# Patient Record
Sex: FEMALE | Race: WHITE | Employment: OTHER | URBAN - METROPOLITAN AREA
[De-identification: names, ages, dates, MRNs, and addresses within clinical notes are randomized per-mention and may not be internally consistent; named-entity substitution may affect disease eponyms.]

---

## 2017-11-27 ENCOUNTER — HOSPITAL ENCOUNTER (EMERGENCY)
Age: 72
Discharge: HOME | End: 2017-11-27
Attending: EMERGENCY MEDICINE
Payer: MEDICARE

## 2017-11-27 ENCOUNTER — APPOINTMENT (OUTPATIENT)
Dept: CT IMAGING | Age: 72
End: 2017-11-27
Attending: EMERGENCY MEDICINE
Payer: MEDICARE

## 2017-11-27 VITALS
OXYGEN SATURATION: 100 % | HEIGHT: 61 IN | SYSTOLIC BLOOD PRESSURE: 149 MMHG | BODY MASS INDEX: 32.1 KG/M2 | RESPIRATION RATE: 20 BRPM | WEIGHT: 170 LBS | DIASTOLIC BLOOD PRESSURE: 59 MMHG | HEART RATE: 71 BPM | TEMPERATURE: 98.2 F

## 2017-11-27 DIAGNOSIS — S00.81XA FACIAL ABRASION, INITIAL ENCOUNTER: ICD-10-CM

## 2017-11-27 DIAGNOSIS — S00.03XA RIGHT TEMPORAL FRONTAL SCALP CONTUSIONS, INITIAL ENCOUNTER: Primary | ICD-10-CM

## 2017-11-27 PROCEDURE — 90715 TDAP VACCINE 7 YRS/> IM: CPT | Performed by: EMERGENCY MEDICINE

## 2017-11-27 PROCEDURE — 99284 EMERGENCY DEPT VISIT MOD MDM: CPT

## 2017-11-27 PROCEDURE — 70450 CT HEAD/BRAIN W/O DYE: CPT

## 2017-11-27 PROCEDURE — 74011250636 HC RX REV CODE- 250/636: Performed by: EMERGENCY MEDICINE

## 2017-11-27 PROCEDURE — 90471 IMMUNIZATION ADMIN: CPT

## 2017-11-27 RX ORDER — SIMVASTATIN 20 MG/1
20 TABLET, FILM COATED ORAL
COMMUNITY

## 2017-11-27 RX ORDER — CITALOPRAM 20 MG/1
TABLET, FILM COATED ORAL DAILY
COMMUNITY
End: 2019-08-14

## 2017-11-27 RX ORDER — GLUCOSAMINE SULFATE 1500 MG
POWDER IN PACKET (EA) ORAL DAILY
COMMUNITY

## 2017-11-27 RX ORDER — OMEPRAZOLE 20 MG/1
20 CAPSULE, DELAYED RELEASE ORAL DAILY
COMMUNITY

## 2017-11-27 RX ORDER — LANOLIN ALCOHOL/MO/W.PET/CERES
1000 CREAM (GRAM) TOPICAL DAILY
COMMUNITY
End: 2019-08-14

## 2017-11-27 RX ORDER — CALCIUM CARBONATE 500(1250)
TABLET ORAL DAILY
COMMUNITY

## 2017-11-27 RX ORDER — AMLODIPINE BESYLATE 10 MG/1
20 TABLET ORAL DAILY
COMMUNITY

## 2017-11-27 RX ORDER — TRIAMTERENE/HYDROCHLOROTHIAZID 37.5-25 MG
TABLET ORAL DAILY
COMMUNITY

## 2017-11-27 RX ADMIN — TETANUS TOXOID, REDUCED DIPHTHERIA TOXOID AND ACELLULAR PERTUSSIS VACCINE, ADSORBED 0.5 ML: 5; 2.5; 8; 8; 2.5 SUSPENSION INTRAMUSCULAR at 15:56

## 2017-11-27 NOTE — DISCHARGE INSTRUCTIONS
Scrapes (Abrasions): Care Instructions  Your Care Instructions  Scrapes (abrasions) are wounds where your skin has been rubbed or torn off. Most scrapes do not go deep into the skin, but some may remove several layers of skin. Scrapes usually don't bleed much, but they may ooze pinkish fluid. Scrapes on the head or face may appear worse than they are. They may bleed a lot because of the good blood supply to this area. Most scrapes heal well and may not need a bandage. They usually heal within 3 to 7 days. A large, deep scrape may take 1 to 2 weeks or longer to heal. A scab may form on some scrapes. Follow-up care is a key part of your treatment and safety. Be sure to make and go to all appointments, and call your doctor if you are having problems. It's also a good idea to know your test results and keep a list of the medicines you take. How can you care for yourself at home? · If your doctor told you how to care for your wound, follow your doctor's instructions. If you did not get instructions, follow this general advice:  ¨ Wash the scrape with clean water 2 times a day. Don't use hydrogen peroxide or alcohol, which can slow healing. ¨ You may cover the scrape with a thin layer of petroleum jelly, such as Vaseline, and a nonstick bandage. ¨ Apply more petroleum jelly and replace the bandage as needed. · Prop up the injured area on a pillow anytime you sit or lie down during the next 3 days. Try to keep it above the level of your heart. This will help reduce swelling. · Be safe with medicines. Take pain medicines exactly as directed. ¨ If the doctor gave you a prescription medicine for pain, take it as prescribed. ¨ If you are not taking a prescription pain medicine, ask your doctor if you can take an over-the-counter medicine. When should you call for help?   Call your doctor now or seek immediate medical care if:  ? · You have signs of infection, such as:  ¨ Increased pain, swelling, warmth, or redness around the scrape. ¨ Red streaks leading from the scrape. ¨ Pus draining from the scrape. ¨ A fever. ? · The scrape starts to bleed, and blood soaks through the bandage. Oozing small amounts of blood is normal.   ? Watch closely for changes in your health, and be sure to contact your doctor if the scrape is not getting better each day. Where can you learn more? Go to http://delphine-nestor.info/. Enter A374 in the search box to learn more about \"Scrapes (Abrasions): Care Instructions. \"  Current as of: March 20, 2017  Content Version: 11.4  © 0175-5313 [employer]. Care instructions adapted under license by [employer] (which disclaims liability or warranty for this information). If you have questions about a medical condition or this instruction, always ask your healthcare professional. [place] [address] any warranty or liability for your use of this information. Contusion: Care Instructions  Your Care Instructions    Contusion is the medical term for a bruise. It is the result of a direct blow or an impact, such as a fall. Contusions are common sports injuries. Most people think of a bruise as a black-and-blue spot. This happens when small blood vessels get torn and leak blood under the skin. But bones, muscles, and organs can also get bruised. This may damage deep tissues but not cause a bruise you can see. The doctor will do a physical exam to find the location of your contusion. You may also have tests to make sure you do not have a more serious injury, such as a broken bone or nerve damage. These may include X-rays or other imaging tests like a CT scan or MRI. Deep-tissue contusions may cause pain and swelling. But if there is no serious damage, they will often get better in a few weeks with home treatment. The doctor has checked you carefully, but problems can develop later.  If you notice any problems or new symptoms, get medical treatment right away. Follow-up care is a key part of your treatment and safety. Be sure to make and go to all appointments, and call your doctor if you are having problems. It's also a good idea to know your test results and keep a list of the medicines you take. How can you care for yourself at home? · Put ice or a cold pack on the sore area for 10 to 20 minutes at a time to stop swelling. Put a thin cloth between the ice pack and your skin. · Be safe with medicines. Read and follow all instructions on the label. ¨ If the doctor gave you a prescription medicine for pain, take it as prescribed. ¨ If you are not taking a prescription pain medicine, ask your doctor if you can take an over-the-counter medicine. · If you can, prop up the sore area on pillows as much as possible for the next few days. Try to keep the sore area above the level of your heart. When should you call for help? Call your doctor now or seek immediate medical care if:  ? · Your pain gets worse. ? · You have new or worse swelling. ? · You have tingling, weakness, or numbness in the area near the contusion. ? · The area near the contusion is cold or pale. ? Watch closely for changes in your health, and be sure to contact your doctor if:  ? · You do not get better as expected. Where can you learn more? Go to http://delphine-nestor.info/. Enter T917 in the search box to learn more about \"Contusion: Care Instructions. \"  Current as of: March 20, 2017  Content Version: 11.4  © 5205-8875 [employer]. Care instructions adapted under license by [employer] (which disclaims liability or warranty for this information). If you have questions about a medical condition or this instruction, always ask your healthcare professional. [address] any warranty or liability for your use of this information.

## 2017-11-27 NOTE — ED TRIAGE NOTES
Patient was walking her dog when she tripped hitting the right temple on the ground. Patient with contusion noted. Patient denies LOC. Sepsis Screening completed    (  )Patient meets SIRS criteria. (x  )Patient does not meet SIRS criteria.       SIRS Criteria is achieved when two or more of the following are present   Temperature < 96.8°F (36°C) or > 100.9°F (38.3°C)   Heart Rate > 90 beats per minute   Respiratory Rate > 20 breaths per minute   WBC count > 12,000 or <4,000 or > 10% bands

## 2017-11-27 NOTE — ED PROVIDER NOTES
EMERGENCY DEPARTMENT HISTORY AND PHYSICAL EXAM    Date: 11/27/2017  Patient Name: Carine Snell    History of Presenting Illness     Chief Complaint   Patient presents with    Head Injury    Fall         History Provided By: Patient    Chief Complaint: Dizziness  Duration: 1 Hours  Timing:  Acute  Severity: 5 out of 10  Associated Symptoms: nausea    Additional History (Context):   3:04 PM  Carine Snell is a 67 y.o. female with PMHX HTN and hyperlipidemia who presents to the emergency department C/O improving dizziness s/p fall, onset 1 hour ago. Associated sxs include nausea. States she \"tastes blood in her mouth. \" Pt was walking her dog and over a tree root, and that she hit her right temple. Last tetanus shot was over 10 years ago. Pt denies LOC, blurry vision, blood thinner use, ASA use, vomiting, PND, sinus pain, and any other sxs or complaints. PCP: Binh Alvarez MD    Current Outpatient Prescriptions   Medication Sig Dispense Refill    amLODIPine (NORVASC) 10 mg tablet Take 20 mg by mouth daily.  triamterene-hydroCHLOROthiazide (MAXZIDE) 37.5-25 mg per tablet Take  by mouth daily.  simvastatin (ZOCOR) 20 mg tablet Take 20 mg by mouth nightly.  calcium carbonate (OS-ABBEY) 500 mg calcium (1,250 mg) tablet Take  by mouth daily.  cholecalciferol (VITAMIN D3) 1,000 unit cap Take  by mouth daily.  citalopram (CELEXA) 20 mg tablet Take  by mouth daily.  omeprazole (PRILOSEC) 20 mg capsule Take 20 mg by mouth daily.  denosumab (PROLIA) 60 mg/mL injection 60 mg by SubCUTAneous route.  cyanocobalamin 1,000 mcg tablet Take 1,000 mcg by mouth daily. Past History     Past Medical History:  Past Medical History:   Diagnosis Date    Depression     HTN (hypertension)     Hyperlipidemia     Menopause        Past Surgical History:  Past Surgical History:   Procedure Laterality Date    HX HYSTERECTOMY      Age 27       Family History:  History reviewed.  No pertinent family history. Social History:  Social History   Substance Use Topics    Smoking status: Never Smoker    Smokeless tobacco: Never Used    Alcohol use No       Allergies: Allergies   Allergen Reactions    Actonel [Risedronate] Hives    Metamucil [Psyllium Husk] Hives         Review of Systems   Review of Systems   Constitutional: Negative for activity change, appetite change, fever and unexpected weight change. HENT: Negative for congestion and sore throat. Eyes: Negative for pain and redness. Respiratory: Negative for cough and shortness of breath. Cardiovascular: Negative for chest pain and palpitations. Gastrointestinal: Negative for abdominal pain, diarrhea, nausea and vomiting. Endocrine: Negative for polydipsia and polyuria. Genitourinary: Negative for difficulty urinating and dysuria. Musculoskeletal: Negative for back pain and neck pain. Skin: Negative for pallor and rash. Neurological: Positive for dizziness (resolved). Negative for syncope and headaches. All other systems reviewed and are negative. Physical Exam     Vitals:    11/27/17 1442   BP: 149/59   Pulse: 71   Resp: 20   Temp: 98.2 °F (36.8 °C)   SpO2: 100%   Weight: 77.1 kg (170 lb)   Height: 5' 1\" (1.549 m)     Physical Exam   Constitutional: She is oriented to person, place, and time. She appears well-developed and well-nourished. HENT:   Head: Normocephalic and atraumatic. Right Ear: External ear normal.   Left Ear: External ear normal.   Nose: Nose normal.   Mouth/Throat: Oropharynx is clear and moist.   Eyes: Conjunctivae and EOM are normal. Pupils are equal, round, and reactive to light. Neck: Normal range of motion. Neck supple. No JVD present. No tracheal deviation present. Cardiovascular: Normal rate, regular rhythm, normal heart sounds and intact distal pulses. Exam reveals no gallop and no friction rub. No murmur heard.   Pulmonary/Chest: Effort normal and breath sounds normal. No respiratory distress. She has no wheezes. She has no rales. Abdominal: Soft. Bowel sounds are normal. She exhibits no distension and no mass. There is no tenderness. There is no rebound and no guarding. Musculoskeletal: Normal range of motion. She exhibits no edema or tenderness. Neurological: She is alert and oriented to person, place, and time. She has normal reflexes. No cranial nerve deficit. She exhibits normal muscle tone. Coordination normal.   CN II-XII intact, no facial droop or asymmetry; No pronator drift; finger-nose-finger intact; good  and equal strength 5/5 bilateral upper and lower extremities; DTRs: 2+ upper and lower extremities, symmetric bilaterally; sensation is intact to light touch and position sense upper and lower extremities symmetric bilaterally;  Gait normal   Skin: Skin is warm and dry. No rash noted. Psychiatric: She has a normal mood and affect. Her behavior is normal.   Nursing note and vitals reviewed. Diagnostic Study Results     Labs -   No results found for this or any previous visit (from the past 12 hour(s)). Radiologic Studies -   CT HEAD WO CONT   Final Result   EXAM: CT head     INDICATION: Head injury.     COMPARISON: 4/26/2006.     TECHNIQUE: Axial CT imaging of the head was performed without intravenous  contrast. Coronal and sagittal reconstructions were obtained.     Dose reduction: One or more dose reduction techniques were used on this CT:  automated exposure control, adjustment of the mAs and/or kVp according to  patient's size, and iterative reconstruction techniques. The specific techniques  utilized on this CT exam have been documented in the patient's electronic  medical record.  _______________     FINDINGS:     BRAIN PARENCHYMA: There is no evidence of acute intracranial hemorrhage, mass  effect, midline shift, or herniation. No definite CT evidence of acute cortical  infarct is seen.  The gray-white matter differentiation is within normal limits. Vascular calcifications are present.     VENTRICLES/EXTRA-AXIAL SPACES/MENINGES: The ventricles and sulci are normal in  their size and configuration.     OSSEOUS STRUCTURES: No fracture is seen.     PARANASAL SINUSES/MASTOIDS: Visualized paranasal sinuses and mastoid air cells  are clear.     ORBITS: The visualized orbits are unremarkable.     OTHER: None.       _______________     IMPRESSION  IMPRESSION:     No acute intracranial hemorrhage, mass effect, midline shift, or herniation. No  definite CT evidence of acute cortical infarct is seen. Please note that  noncontrast head CT may be normal in early acute infarct. No new abnormality is seen developing since last study. As read by the radiologist.     Gilda Castro Results  (Last 48 hours)               11/27/17 1616  CT HEAD WO CONT Final result    Impression:  IMPRESSION:       No acute intracranial hemorrhage, mass effect, midline shift, or herniation. No   definite CT evidence of acute cortical infarct is seen. Please note that   noncontrast head CT may be normal in early acute infarct. No new abnormality is seen developing since last study. Narrative:  EXAM: CT head       INDICATION: Head injury. COMPARISON: 4/26/2006. TECHNIQUE: Axial CT imaging of the head was performed without intravenous   contrast. Coronal and sagittal reconstructions were obtained. Dose reduction: One or more dose reduction techniques were used on this CT:   automated exposure control, adjustment of the mAs and/or kVp according to   patient's size, and iterative reconstruction techniques. The specific techniques   utilized on this CT exam have been documented in the patient's electronic   medical record.   _______________       FINDINGS:       BRAIN PARENCHYMA: There is no evidence of acute intracranial hemorrhage, mass   effect, midline shift, or herniation. No definite CT evidence of acute cortical   infarct is seen.  The gray-white matter differentiation is within normal limits. Vascular calcifications are present. VENTRICLES/EXTRA-AXIAL SPACES/MENINGES: The ventricles and sulci are normal in   their size and configuration. OSSEOUS STRUCTURES: No fracture is seen. PARANASAL SINUSES/MASTOIDS: Visualized paranasal sinuses and mastoid air cells   are clear. ORBITS: The visualized orbits are unremarkable. OTHER: None.         _______________               CXR Results  (Last 48 hours)    None            Medical Decision Making   I am the first provider for this patient. I reviewed the vital signs, available nursing notes, past medical history, past surgical history, family history and social history. Vital Signs-Reviewed the patient's vital signs. Pulse Oximetry Analysis - 100% on RA     Records Reviewed: Nursing Notes    Provider Notes (Medical Decision Making):   DDx: Fx, contusion, intracranial hemorrhage, concussion    Procedures:  Procedures    ED Course:   4:39 PM   Initial assessment performed. The patients presenting problems have been discussed, and they are in agreement with the care plan formulated and outlined with them. I have encouraged them to ask questions as they arise throughout their visit. Diagnosis and Disposition       DISCHARGE NOTE:  4:49 PM  [facility]  results have been reviewed with her. She has been counseled regarding her diagnosis, treatment, and plan. She verbally conveys understanding and agreement of the signs, symptoms, diagnosis, treatment and prognosis and additionally agrees to follow up as discussed. She also agrees with the care-plan and conveys that all of her questions have been answered. I have also provided discharge instructions for her that include: educational information regarding their diagnosis and treatment, and list of reasons why they would want to return to the ED prior to their follow-up appointment, should her condition change.  She has been provided with education for proper emergency department utilization. CLINICAL IMPRESSION:    1. Right temporal frontal scalp contusions, initial encounter    2. Facial abrasion, initial encounter        Discussion:  67 y.o. female presented with right forehead injury. Patient stable in the ED. Neuro exam nml. Head CT scan unremarkable. No intracranial hemorrhage or fx. Patient given TDAP IM. Recommended to follow up with PCP for further evaluation. Patient given head injury precautions. PLAN:  1. D/C Home  2. Discharge Medication List as of 11/27/2017  4:41 PM        3. Follow-up Information     Follow up With Details Comments Contact Info    Fba Bedolla MD Schedule an appointment as soon as possible for a visit in 2 days for PCP follow up [phone]  [address] [id number] [phone]      [facility] EMERGENCY DEPT  As needed, If symptoms worsen [address] [id number] [phone]        _______________________________    Attestations: This note is prepared by Jaja Herbert, acting as Scribe for Erick Rose MD.    Erick Rose MD:  The scribe's documentation has been prepared under my direction and personally reviewed by me in its entirety.   I confirm that the note above accurately reflects all work, treatment, procedures, and medical decision making performed by me.  _______________________________

## 2018-04-05 ENCOUNTER — HOSPITAL ENCOUNTER (OUTPATIENT)
Dept: MRI IMAGING | Age: 73
Discharge: HOME | End: 2018-04-05
Attending: ORTHOPAEDIC SURGERY
Payer: MEDICARE

## 2018-04-05 DIAGNOSIS — M54.9 ACUTE BACK PAIN: ICD-10-CM

## 2018-04-05 PROCEDURE — 72148 MRI LUMBAR SPINE W/O DYE: CPT

## 2019-07-27 ENCOUNTER — APPOINTMENT (OUTPATIENT)
Dept: GENERAL RADIOLOGY | Age: 74
End: 2019-07-27
Attending: EMERGENCY MEDICINE
Payer: MEDICARE

## 2019-07-27 ENCOUNTER — HOSPITAL ENCOUNTER (EMERGENCY)
Age: 74
Discharge: HOME | End: 2019-07-28
Attending: EMERGENCY MEDICINE | Admitting: EMERGENCY MEDICINE
Payer: MEDICARE

## 2019-07-27 ENCOUNTER — APPOINTMENT (OUTPATIENT)
Dept: CT IMAGING | Age: 74
End: 2019-07-27
Attending: EMERGENCY MEDICINE
Payer: MEDICARE

## 2019-07-27 DIAGNOSIS — K80.20 CALCULUS OF GALLBLADDER WITHOUT CHOLECYSTITIS WITHOUT OBSTRUCTION: Primary | ICD-10-CM

## 2019-07-27 LAB
ALBUMIN SERPL-MCNC: 3.1 G/DL (ref 3.4–5)
ALBUMIN/GLOB SERPL: 0.9 {RATIO} (ref 0.8–1.7)
ALP SERPL-CCNC: 66 U/L (ref 45–117)
ALT SERPL-CCNC: 15 U/L (ref 13–56)
ANION GAP SERPL CALC-SCNC: 9 MMOL/L (ref 3–18)
AST SERPL-CCNC: 13 U/L (ref 10–38)
BASOPHILS # BLD: 0 K/UL (ref 0–0.1)
BASOPHILS NFR BLD: 0 % (ref 0–2)
BILIRUB SERPL-MCNC: 0.3 MG/DL (ref 0.2–1)
BUN SERPL-MCNC: 20 MG/DL (ref 7–18)
BUN/CREAT SERPL: 21 (ref 12–20)
CALCIUM SERPL-MCNC: 8.3 MG/DL (ref 8.5–10.1)
CHLORIDE SERPL-SCNC: 97 MMOL/L (ref 100–111)
CK MB CFR SERPL CALC: 1.8 % (ref 0–4)
CK MB SERPL-MCNC: 1 NG/ML (ref 5–25)
CK SERPL-CCNC: 56 U/L (ref 26–192)
CO2 SERPL-SCNC: 29 MMOL/L (ref 21–32)
CREAT SERPL-MCNC: 0.97 MG/DL (ref 0.6–1.3)
DIFFERENTIAL METHOD BLD: ABNORMAL
EOSINOPHIL # BLD: 0.4 K/UL (ref 0–0.4)
EOSINOPHIL NFR BLD: 2 % (ref 0–5)
ERYTHROCYTE [DISTWIDTH] IN BLOOD BY AUTOMATED COUNT: 13.3 % (ref 11.6–14.5)
GLOBULIN SER CALC-MCNC: 3.4 G/DL (ref 2–4)
GLUCOSE SERPL-MCNC: 111 MG/DL (ref 74–99)
HCT VFR BLD AUTO: 33.5 % (ref 35–45)
HGB BLD-MCNC: 10.9 G/DL (ref 12–16)
LIPASE SERPL-CCNC: 154 U/L (ref 73–393)
LYMPHOCYTES # BLD: 2.7 K/UL (ref 0.9–3.6)
LYMPHOCYTES NFR BLD: 16 % (ref 21–52)
MAGNESIUM SERPL-MCNC: 2.1 MG/DL (ref 1.6–2.6)
MCH RBC QN AUTO: 29.5 PG (ref 24–34)
MCHC RBC AUTO-ENTMCNC: 32.5 G/DL (ref 31–37)
MCV RBC AUTO: 90.5 FL (ref 74–97)
MONOCYTES # BLD: 1.5 K/UL (ref 0.05–1.2)
MONOCYTES NFR BLD: 9 % (ref 3–10)
NEUTS SEG # BLD: 12.2 K/UL (ref 1.8–8)
NEUTS SEG NFR BLD: 73 % (ref 40–73)
PLATELET # BLD AUTO: 232 K/UL (ref 135–420)
PMV BLD AUTO: 9.7 FL (ref 9.2–11.8)
POTASSIUM SERPL-SCNC: 3.4 MMOL/L (ref 3.5–5.5)
PROT SERPL-MCNC: 6.5 G/DL (ref 6.4–8.2)
RBC # BLD AUTO: 3.7 M/UL (ref 4.2–5.3)
SODIUM SERPL-SCNC: 135 MMOL/L (ref 136–145)
TROPONIN I SERPL-MCNC: <0.02 NG/ML (ref 0–0.04)
WBC # BLD AUTO: 16.8 K/UL (ref 4.6–13.2)

## 2019-07-27 PROCEDURE — 99285 EMERGENCY DEPT VISIT HI MDM: CPT

## 2019-07-27 PROCEDURE — 83690 ASSAY OF LIPASE: CPT

## 2019-07-27 PROCEDURE — 96375 TX/PRO/DX INJ NEW DRUG ADDON: CPT

## 2019-07-27 PROCEDURE — 82550 ASSAY OF CK (CPK): CPT

## 2019-07-27 PROCEDURE — 80053 COMPREHEN METABOLIC PANEL: CPT

## 2019-07-27 PROCEDURE — 74011250637 HC RX REV CODE- 250/637: Performed by: EMERGENCY MEDICINE

## 2019-07-27 PROCEDURE — 85025 COMPLETE CBC W/AUTO DIFF WBC: CPT

## 2019-07-27 PROCEDURE — 71045 X-RAY EXAM CHEST 1 VIEW: CPT

## 2019-07-27 PROCEDURE — 74011636320 HC RX REV CODE- 636/320: Performed by: EMERGENCY MEDICINE

## 2019-07-27 PROCEDURE — 96374 THER/PROPH/DIAG INJ IV PUSH: CPT

## 2019-07-27 PROCEDURE — 83735 ASSAY OF MAGNESIUM: CPT

## 2019-07-27 PROCEDURE — 74177 CT ABD & PELVIS W/CONTRAST: CPT

## 2019-07-27 PROCEDURE — 74011250636 HC RX REV CODE- 250/636: Performed by: EMERGENCY MEDICINE

## 2019-07-27 PROCEDURE — 93005 ELECTROCARDIOGRAM TRACING: CPT

## 2019-07-27 RX ORDER — METOCLOPRAMIDE HYDROCHLORIDE 5 MG/ML
10 INJECTION INTRAMUSCULAR; INTRAVENOUS
Status: COMPLETED | OUTPATIENT
Start: 2019-07-27 | End: 2019-07-27

## 2019-07-27 RX ORDER — FAMOTIDINE 10 MG/ML
20 INJECTION INTRAVENOUS
Status: COMPLETED | OUTPATIENT
Start: 2019-07-27 | End: 2019-07-27

## 2019-07-27 RX ORDER — ONDANSETRON 4 MG/1
4 TABLET, ORALLY DISINTEGRATING ORAL
Status: COMPLETED | OUTPATIENT
Start: 2019-07-27 | End: 2019-07-27

## 2019-07-27 RX ORDER — NITROGLYCERIN 0.4 MG/1
0.4 TABLET SUBLINGUAL ONCE
Status: COMPLETED | OUTPATIENT
Start: 2019-07-27 | End: 2019-07-27

## 2019-07-27 RX ADMIN — ONDANSETRON 4 MG: 4 TABLET, ORALLY DISINTEGRATING ORAL at 21:36

## 2019-07-27 RX ADMIN — NITROGLYCERIN 0.4 MG: 0.4 TABLET SUBLINGUAL at 21:32

## 2019-07-27 RX ADMIN — METOCLOPRAMIDE 10 MG: 5 INJECTION, SOLUTION INTRAMUSCULAR; INTRAVENOUS at 21:37

## 2019-07-27 RX ADMIN — FAMOTIDINE 20 MG: 10 INJECTION, SOLUTION INTRAVENOUS at 21:37

## 2019-07-27 RX ADMIN — IOPAMIDOL 100 ML: 612 INJECTION, SOLUTION INTRAVENOUS at 22:18

## 2019-07-28 VITALS
RESPIRATION RATE: 23 BRPM | HEIGHT: 61 IN | OXYGEN SATURATION: 89 % | DIASTOLIC BLOOD PRESSURE: 53 MMHG | WEIGHT: 186 LBS | SYSTOLIC BLOOD PRESSURE: 131 MMHG | HEART RATE: 68 BPM | TEMPERATURE: 98.5 F | BODY MASS INDEX: 35.12 KG/M2

## 2019-07-28 RX ORDER — ONDANSETRON 4 MG/1
4 TABLET, FILM COATED ORAL
Qty: 12 TAB | Refills: 0 | Status: SHIPPED | OUTPATIENT
Start: 2019-07-28

## 2019-07-28 RX ORDER — METOCLOPRAMIDE 10 MG/1
10 TABLET ORAL
Qty: 20 TAB | Refills: 1 | Status: SHIPPED | OUTPATIENT
Start: 2019-07-28

## 2019-07-28 NOTE — ED NOTES
I have reviewed discharge instructions with the patient and spouse. The patient and spouse verbalized understanding. Patient armband removed and shredded  Patient d/c home in stable condition, ambulatory. No distress noted.

## 2019-07-28 NOTE — ED NOTES
Patient reports right sided chest discomfort underneath breast, patient is constantly belching. Patient reports eating fish, french fries, jello with lots of whipped cream for dinner this evening.

## 2019-07-28 NOTE — ED PROVIDER NOTES
EMERGENCY DEPARTMENT HISTORY AND PHYSICAL EXAM    Date: 7/27/2019  Patient Name: Jonn Aparicio    History of Presenting Illness     Chief Complaint   Patient presents with    Cough    Indigestion         History Provided By: Patient    Additional History (Context):     9:15 PM    Jonn Aparicio is a 76 y.o. female with pertinent PMHx of HTN and HLD presenting via EMS to the ED c/o constant eructations x ~4 hours ago. Pt notes associated symptoms of RUQ abdominal pain, abdominal distension, and right mid chest pain. Pt states that her sxs began slightly after dinner (she had steak and fries for dinner). Pt states that she was recently seen by her PCP for cough. She stated she had a CXR, but was not given any abx. Pt denies any history of cholecystectomy, MI, strokes, or appendectomy. Pt specifically denies any vomiting, urinary sxs, fever/chills, or diarrhea. PCP: Pricila Jiménez MD  Pt does not smoke tobacco, drink EtOH excessively, or do illicit drugs. There are no other complaints, changes, or physical findings at this time. Current Outpatient Medications   Medication Sig Dispense Refill    metoclopramide HCl (REGLAN) 10 mg tablet Take 1 Tab by mouth every six (6) hours as needed (biliary colic). 20 Tab 1    amLODIPine (NORVASC) 10 mg tablet Take 20 mg by mouth daily.  triamterene-hydroCHLOROthiazide (MAXZIDE) 37.5-25 mg per tablet Take  by mouth daily.  simvastatin (ZOCOR) 20 mg tablet Take 20 mg by mouth nightly.  calcium carbonate (OS-ABBEY) 500 mg calcium (1,250 mg) tablet Take  by mouth daily.  cholecalciferol (VITAMIN D3) 1,000 unit cap Take  by mouth daily.  citalopram (CELEXA) 20 mg tablet Take  by mouth daily.  omeprazole (PRILOSEC) 20 mg capsule Take 20 mg by mouth daily.  denosumab (PROLIA) 60 mg/mL injection 60 mg by SubCUTAneous route.  cyanocobalamin 1,000 mcg tablet Take 1,000 mcg by mouth daily.          Past History     Past Medical History:  Past Medical History:   Diagnosis Date    Depression     HTN (hypertension)     Hyperlipidemia     Menopause        Past Surgical History:  Past Surgical History:   Procedure Laterality Date    HX HYSTERECTOMY      Age 27       Family History:  History reviewed. No pertinent family history. Social History:  Social History     Tobacco Use    Smoking status: Never Smoker    Smokeless tobacco: Never Used   Substance Use Topics    Alcohol use: No    Drug use: Not on file       Allergies: Allergies   Allergen Reactions    Actonel [Risedronate] Hives    Metamucil [Psyllium Husk] Hives         Review of Systems   Review of Systems   Constitutional: Negative for chills and fever. Cardiovascular: Positive for chest pain (right sided). Gastrointestinal: Positive for abdominal distention and abdominal pain (RUQ). Negative for diarrhea and vomiting. Positive for eructations   Genitourinary: Negative. All other systems reviewed and are negative. Physical Exam     Vitals:    07/27/19 2130 07/27/19 2132 07/27/19 2145 07/27/19 2245   BP: 130/42 130/42 (!) 126/39 136/48   Pulse: 68 69 71 74   Resp: 21  21 29   Temp:       SpO2: 94%  93% 95%   Weight:       Height:         Physical Exam   Constitutional: She is oriented to person, place, and time. She appears well-developed and well-nourished. No distress. Nontoxic, uncomfortable appearing with persistent hiccups   HENT:   Head: Normocephalic and atraumatic. Right Ear: External ear normal.   Left Ear: External ear normal.   Mouth/Throat: Oropharynx is clear and moist. No oropharyngeal exudate. Dry mucous membranes   Eyes: Pupils are equal, round, and reactive to light. Conjunctivae and EOM are normal. No scleral icterus. No pallor   Neck: Normal range of motion. Neck supple. No JVD present. No tracheal deviation present. No thyromegaly present. Cardiovascular: Normal rate, regular rhythm and normal heart sounds.    Pulmonary/Chest: Effort normal and breath sounds normal. No stridor. No respiratory distress. Abdominal: Soft. Bowel sounds are normal. There is no tenderness. There is no rebound and no guarding. Protuberant abdomen without significant distension   Musculoskeletal: Normal range of motion. She exhibits no edema or tenderness. No soft tissue injuries   Lymphadenopathy:     She has no cervical adenopathy. Neurological: She is alert and oriented to person, place, and time. She has normal reflexes. No cranial nerve deficit. Coordination normal.   Skin: Skin is warm and dry. No rash noted. She is not diaphoretic. No erythema. Psychiatric: She has a normal mood and affect. Her behavior is normal. Judgment and thought content normal.   Nursing note and vitals reviewed. Diagnostic Study Results     Labs -     Recent Results (from the past 12 hour(s))   EKG, 12 LEAD, INITIAL    Collection Time: 07/27/19  8:27 PM   Result Value Ref Range    Ventricular Rate 71 BPM    Atrial Rate 71 BPM    P-R Interval 168 ms    QRS Duration 148 ms    Q-T Interval 462 ms    QTC Calculation (Bezet) 502 ms    Calculated P Axis 53 degrees    Calculated T Axis 112 degrees    Diagnosis       Normal sinus rhythm  Left bundle branch block  Abnormal ECG  When compared with ECG of 01-AUG-2012 11:28,  QRS duration has increased     CBC WITH AUTOMATED DIFF    Collection Time: 07/27/19  8:35 PM   Result Value Ref Range    WBC 16.8 (H) 4.6 - 13.2 K/uL    RBC 3.70 (L) 4.20 - 5.30 M/uL    HGB 10.9 (L) 12.0 - 16.0 g/dL    HCT 33.5 (L) 35.0 - 45.0 %    MCV 90.5 74.0 - 97.0 FL    MCH 29.5 24.0 - 34.0 PG    MCHC 32.5 31.0 - 37.0 g/dL    RDW 13.3 11.6 - 14.5 %    PLATELET 119 070 - 971 K/uL    MPV 9.7 9.2 - 11.8 FL    NEUTROPHILS 73 40 - 73 %    LYMPHOCYTES 16 (L) 21 - 52 %    MONOCYTES 9 3 - 10 %    EOSINOPHILS 2 0 - 5 %    BASOPHILS 0 0 - 2 %    ABS. NEUTROPHILS 12.2 (H) 1.8 - 8.0 K/UL    ABS. LYMPHOCYTES 2.7 0.9 - 3.6 K/UL    ABS.  MONOCYTES 1.5 (H) 0.05 - 1.2 K/UL    ABS. EOSINOPHILS 0.4 0.0 - 0.4 K/UL    ABS. BASOPHILS 0.0 0.0 - 0.1 K/UL    DF AUTOMATED     CARDIAC PANEL,(CK, CKMB & TROPONIN)    Collection Time: 07/27/19  8:35 PM   Result Value Ref Range    CK 56 26 - 192 U/L    CK - MB 1.0 <3.6 ng/ml    CK-MB Index 1.8 0.0 - 4.0 %    Troponin-I, QT <0.02 0.0 - 9.189 NG/ML   METABOLIC PANEL, COMPREHENSIVE    Collection Time: 07/27/19  8:35 PM   Result Value Ref Range    Sodium 135 (L) 136 - 145 mmol/L    Potassium 3.4 (L) 3.5 - 5.5 mmol/L    Chloride 97 (L) 100 - 111 mmol/L    CO2 29 21 - 32 mmol/L    Anion gap 9 3.0 - 18 mmol/L    Glucose 111 (H) 74 - 99 mg/dL    BUN 20 (H) 7.0 - 18 MG/DL    Creatinine 0.97 0.6 - 1.3 MG/DL    BUN/Creatinine ratio 21 (H) 12 - 20      GFR est AA >60 >60 ml/min/1.73m2    GFR est non-AA 56 (L) >60 ml/min/1.73m2    Calcium 8.3 (L) 8.5 - 10.1 MG/DL    Bilirubin, total 0.3 0.2 - 1.0 MG/DL    ALT (SGPT) 15 13 - 56 U/L    AST (SGOT) 13 10 - 38 U/L    Alk. phosphatase 66 45 - 117 U/L    Protein, total 6.5 6.4 - 8.2 g/dL    Albumin 3.1 (L) 3.4 - 5.0 g/dL    Globulin 3.4 2.0 - 4.0 g/dL    A-G Ratio 0.9 0.8 - 1.7     LIPASE    Collection Time: 07/27/19  8:35 PM   Result Value Ref Range    Lipase 154 73 - 393 U/L   MAGNESIUM    Collection Time: 07/27/19  8:35 PM   Result Value Ref Range    Magnesium 2.1 1.6 - 2.6 mg/dL       Radiologic Studies -   CT ABD PELV W CONT   Final Result   IMPRESSION[de-identified] Numerous pulmonary nodules seen at both lung bases. Follow-up as   per Fleischner Society protocol. Suggestion of 8 mm polyp or stone in the neck of the gallbladder      Colonic diverticulosis without diverticulitis      Cystic structures in the pelvis which do not appear to communicate with the   urinary bladder suspicious for pelvic cystic masses. Advise pelvic ultrasound   for further evaluation.       Small low-density lesion in the head of the pancreas possibly a pancreatic cyst   or cystic mass advise follow-up with MRI in one year to evaluate for continued   stability.      ========      Fleischner Society Pulmonary Nodule Guidelines (revised 2017): Multiple solid nodules >8 mm:   -Low risk for lung cancer: CT at 3-6 months, then consider CT at 18-24 months.    -High risk for lung cancer: CT at 3-6 months, then CT at 18-24 months. XR CHEST PORT    (Results Pending)     CT Results  (Last 48 hours)               07/27/19 2233  CT ABD PELV W CONT Final result    Impression:  IMPRESSION[de-identified] Numerous pulmonary nodules seen at both lung bases. Follow-up as   per Fleischner Society protocol. Suggestion of 8 mm polyp or stone in the neck of the gallbladder       Colonic diverticulosis without diverticulitis       Cystic structures in the pelvis which do not appear to communicate with the   urinary bladder suspicious for pelvic cystic masses. Advise pelvic ultrasound   for further evaluation. Small low-density lesion in the head of the pancreas possibly a pancreatic cyst   or cystic mass advise follow-up with MRI in one year to evaluate for continued   stability.       ========       Fleischner Society Pulmonary Nodule Guidelines (revised 2017): Multiple solid nodules >8 mm:   -Low risk for lung cancer: CT at 3-6 months, then consider CT at 18-24 months.    -High risk for lung cancer: CT at 3-6 months, then CT at 18-24 months. Narrative:  EXAM: CT of the abdomen and pelvis       INDICATION: Abdominal pain, belching       COMPARISON: None. TECHNIQUE: Axial CT imaging of the abdomen and pelvis was performed with   intravenous contrast. Multiplanar reformats were generated. One or more dose   reduction techniques were used on this CT: automated exposure control,   adjustment of the mAs and/or kVp according to patient size, and iterative   reconstruction techniques. The specific techniques used on this CT exam have   been documented in the patient's electronic medical record.  Digital Imaging and   Communications in Medicine (DICOM) format image data are available to   nonaffiliated external healthcare facilities or entities on a secure, media   free, reciprocally searchable basis with patient authorization for at least a   12-month period after this study. _______________       FINDINGS:       LOWER CHEST: There are multiple nodular densities at both lung bases 2 numerous   to count. Largest of these measures 1 cm. Follow-up as per Fleischner Society   protocol. LIVER, BILIARY: Liver is normal. No biliary dilation. There is 8 mm polyp or   stone in the neck of the gallbladder. No CT evidence for acute cholecystitis. This may represent an underlying polyp. PANCREAS: There is a 7 mm low-density structure in the head of the pancreas   suggesting a pancreatic cyst or cystic mass. Advise MRI for further evaluation   in one year to evaluate for continued stability. SPLEEN: Normal.       ADRENALS: Normal.       KIDNEYS: Cortical cyst seen in the midpole the left kidney. The kidneys are   unremarkable otherwise. VASCULATURE: Moderate to severe calcific atherosclerosis present. LYMPH NODES: No enlarged lymph nodes. GASTROINTESTINAL TRACT: No bowel dilation or wall thickening. Colonic   diverticulosis present without diverticulitis. Appendix is not visualized. No   bowel obstruction. PELVIC ORGANS: Hysterectomy. Urinary bladder is unremarkable. Anterior to the   urinary bladder there is a 5.7 x 4.4 x 5 cm cystic structure which does not   appear to definitely communicate with the urinary bladder to suggest a bladder   diverticulum. This may represent a cystic pelvic mass. Advise pelvic ultrasound   for further evaluation. A second cystic structure seen in the right hemipelvis   posterior and lateral to the urinary bladder measuring 3 x 1.8 cm again   differential is the same. BONES: No acute or aggressive osseous abnormalities identified.        OTHER: None.       _______________ 11:45 PM  RADIOLOGY FINDINGS  Chest X-ray shows NAP  Pending review by Radiologist  Recorded by Monica Villa, ED Scribe, as dictated by Bret Montero. Bakari Kent MD.       Medical Decision Making   I am the first provider for this patient. I reviewed the vital signs, available nursing notes, past medical history, past surgical history, family history and social history. Vital Signs-Reviewed the patient's vital signs. Pulse Oximetry Analysis - 98% on RA     Cardiac Monitor:  Rate: 70 bpm  Rhythm: NSR    EKG interpretation: (Preliminary)  NSR at 71 bpm. LBBB. No sgarbossa criteria. EKG read by Bret Montero. Bakari Kent MD at 2027     Records Reviewed: Nursing Notes and Old Medical Records    Provider Notes (Medical Decision Making): DDx: persistent belching suggests slowing of GI sx. Unlikely this is anginal equivalent. EKG shows a left bundle which is stable compared to 2012. Will try to use Reglan, Pepcid, and NTG to mobilize GI sxs. Given her WBC, will likely scan for obstruction or infection process. PROCEDURES:  Procedures    MEDICATIONS GIVEN IN THE ED:  Medications   famotidine (PF) (PEPCID) injection 20 mg (20 mg IntraVENous Given 7/27/19 2137)   metoclopramide HCl (REGLAN) injection 10 mg (10 mg IntraVENous Given 7/27/19 2137)   ondansetron (ZOFRAN ODT) tablet 4 mg (4 mg Oral Given 7/27/19 2136)   nitroglycerin (NITROSTAT) tablet 0.4 mg (0.4 mg SubLINGual Given 7/27/19 2132)   iopamidol (ISOVUE 300) 61 % contrast injection  mL (100 mL IntraVENous Given 7/27/19 2218)        ED COURSE:   9:15 PM   Initial assessment performed. PROGRESS NOTE:  10:02 PM  Pt and/or family have been updated on their results. Pt and/or pt's family are aware of the plan of care and are in agreement. Written by Marge Hicks ED Scribe, as dictated by Julio Kent MD.      10:15 PM - Pt is no longer hiccupping and feels better. 12:08 AM - Patient states that she is comfortable with going home. Discussed risks and benefits. Diagnosis and Disposition       DISCHARGE NOTE:  12:11 AM  The patient is ready for discharge. The patient's signs, symptoms, diagnosis, and discharge instructions have been discussed and the patient and/or family has conveyed their understanding. The patient and/or family is to follow up as recommended or return to the ER should their symptoms worsen. Plan has been discussed and the patient and/or family is in agreement. Written by Damaris Rodriguez ED Scribe, as dictated by Edith Sahu MD.     CLINICAL IMPRESSION:  1. Calculus of gallbladder without cholecystitis without obstruction        PLAN:  1. D/C Home  2. Current Discharge Medication List      START taking these medications    Details   metoclopramide HCl (REGLAN) 10 mg tablet Take 1 Tab by mouth every six (6) hours as needed (biliary colic). Qty: 20 Tab, Refills: 1         CONTINUE these medications which have NOT CHANGED    Details   amLODIPine (NORVASC) 10 mg tablet Take 20 mg by mouth daily. triamterene-hydroCHLOROthiazide (MAXZIDE) 37.5-25 mg per tablet Take  by mouth daily. simvastatin (ZOCOR) 20 mg tablet Take 20 mg by mouth nightly. calcium carbonate (OS-ABBEY) 500 mg calcium (1,250 mg) tablet Take  by mouth daily. cholecalciferol (VITAMIN D3) 1,000 unit cap Take  by mouth daily. citalopram (CELEXA) 20 mg tablet Take  by mouth daily. omeprazole (PRILOSEC) 20 mg capsule Take 20 mg by mouth daily. denosumab (PROLIA) 60 mg/mL injection 60 mg by SubCUTAneous route. cyanocobalamin 1,000 mcg tablet Take 1,000 mcg by mouth daily. 3.   Follow-up Information    None       _______________________________    Attestations: This note is prepared by Re Samano, acting as Scribe for Keysha Sahu MD.    Keysha Sahu MD:  The scribe's documentation has been prepared under my direction and personally reviewed by me in its entirety.  I confirm that the note above accurately reflects all work, treatment, procedures, and medical decision making performed by me.  _______________________________

## 2019-07-28 NOTE — ED NOTES
Oxygen saturations dropped to 87% while sleeping, patient reports she has sleep apnea, nasal cannula 2L placed and patients sats back up to normal. Dr. Duy Vann at bedside.

## 2019-07-28 NOTE — ED TRIAGE NOTES
Pt arrives via ems stretcher with c\o pain under right breast, incessant belching, indigestion,after eating dinner, pt sts she has had gallbladder pain before,  pt is able to make needs known speaking in complete sentences, pt in nad at this time

## 2019-07-28 NOTE — DISCHARGE INSTRUCTIONS
Patient Education        Biliary Colic: Care Instructions  Your Care Instructions    Biliary (say \"BILL-ee-air-ee\") colic is belly pain caused by gallbladder problems. It is usually caused by a gallstone moving through or blocking the common bile duct or cystic duct. Gallstones are stones that form in the gallbladder. They are made of cholesterol and other substances. The gallbladder is a small sac located just under the liver. It stores bile released by the liver. Bile helps you digest fats. Gallstones also can form in the common bile duct or cystic duct. These ducts carry bile from the gallbladder and the liver to the small intestine. Gallstones may be as small as a grain of sand or as large as a golf ball. Gallstones that cause severe symptoms usually are treated with surgery to remove the gallbladder. If the first attack of biliary colic is mild, it is often safe to wait until you have had another attack before you think about having surgery. The doctor has checked you carefully, but problems can develop later. If you notice any problems or new symptoms, get medical treatment right away. Follow-up care is a key part of your treatment and safety. Be sure to make and go to all appointments, and call your doctor if you are having problems. It's also a good idea to know your test results and keep a list of the medicines you take. How can you care for yourself at home? · Take pain medicines exactly as directed. ? If the doctor gave you a prescription medicine for pain, take it as prescribed. ? If you are not taking a prescription pain medicine, ask your doctor if you can take an over-the-counter medicine. Read and follow all instructions on the label. · Avoid foods that cause symptoms, especially fatty foods. These can cause biliary colic. · You may need more tests to look at your gallbladder. When should you call for help? Call your doctor now or seek immediate medical care if:    · You have a fever.   · You have new belly pain, or your pain gets worse.     · There is a new or increasing yellow tint to your skin or the whites of your eyes.     · Your urine is dark yellow-brown, or your stools are light-colored or white.     · You cannot keep down fluids.    Watch closely for changes in your health, and be sure to contact your doctor if:    · You do not get better as expected.     · You are not getting better after 1 day (24 hours). Where can you learn more? Go to http://delphine-nestor.info/. Enter Y861 in the search box to learn more about \"Biliary Colic: Care Instructions. \"  Current as of: November 7, 2018  Content Version: 12.1  © 3692-1165 [employer]. Care instructions adapted under license by [employer] (which disclaims liability or warranty for this information). If you have questions about a medical condition or this instruction, always ask your healthcare professional. [place] [address] any warranty or liability for your use of this information.

## 2019-07-29 LAB
ATRIAL RATE: 71 BPM
CALCULATED P AXIS, ECG09: 53 DEGREES
CALCULATED T AXIS, ECG11: 112 DEGREES
DIAGNOSIS, 93000: NORMAL
P-R INTERVAL, ECG05: 168 MS
Q-T INTERVAL, ECG07: 462 MS
QRS DURATION, ECG06: 148 MS
QTC CALCULATION (BEZET), ECG08: 502 MS
VENTRICULAR RATE, ECG03: 71 BPM

## 2019-08-14 ENCOUNTER — HOSPITAL ENCOUNTER (OUTPATIENT)
Dept: PREADMISSION TESTING | Age: 74
Discharge: HOME | End: 2019-08-14
Payer: MEDICARE

## 2019-08-14 ENCOUNTER — ANESTHESIA EVENT (OUTPATIENT)
Dept: SURGERY | Age: 74
End: 2019-08-14
Payer: MEDICARE

## 2019-08-14 LAB
ALBUMIN SERPL-MCNC: 3.6 G/DL (ref 3.4–5)
ALBUMIN/GLOB SERPL: 0.9 {RATIO} (ref 0.8–1.7)
ALP SERPL-CCNC: 63 U/L (ref 45–117)
ALT SERPL-CCNC: 21 U/L (ref 13–56)
ANION GAP SERPL CALC-SCNC: 6 MMOL/L (ref 3–18)
APTT PPP: 29.3 SEC (ref 23–36.4)
AST SERPL-CCNC: 16 U/L (ref 10–38)
BASOPHILS # BLD: 0.1 K/UL (ref 0–0.1)
BASOPHILS NFR BLD: 1 % (ref 0–2)
BILIRUB SERPL-MCNC: 0.3 MG/DL (ref 0.2–1)
BUN SERPL-MCNC: 18 MG/DL (ref 7–18)
BUN/CREAT SERPL: 20 (ref 12–20)
CALCIUM SERPL-MCNC: 9.4 MG/DL (ref 8.5–10.1)
CHLORIDE SERPL-SCNC: 100 MMOL/L (ref 100–111)
CO2 SERPL-SCNC: 34 MMOL/L (ref 21–32)
CREAT SERPL-MCNC: 0.88 MG/DL (ref 0.6–1.3)
DIFFERENTIAL METHOD BLD: ABNORMAL
EOSINOPHIL # BLD: 0.1 K/UL (ref 0–0.4)
EOSINOPHIL NFR BLD: 1 % (ref 0–5)
ERYTHROCYTE [DISTWIDTH] IN BLOOD BY AUTOMATED COUNT: 14 % (ref 11.6–14.5)
GLOBULIN SER CALC-MCNC: 3.8 G/DL (ref 2–4)
GLUCOSE SERPL-MCNC: 79 MG/DL (ref 74–99)
HCT VFR BLD AUTO: 38 % (ref 35–45)
HGB BLD-MCNC: 11.8 G/DL (ref 12–16)
INR PPP: 1.1 (ref 0.8–1.2)
LYMPHOCYTES # BLD: 2.3 K/UL (ref 0.9–3.6)
LYMPHOCYTES NFR BLD: 21 % (ref 21–52)
MCH RBC QN AUTO: 28.7 PG (ref 24–34)
MCHC RBC AUTO-ENTMCNC: 31.1 G/DL (ref 31–37)
MCV RBC AUTO: 92.5 FL (ref 74–97)
MONOCYTES # BLD: 0.8 K/UL (ref 0.05–1.2)
MONOCYTES NFR BLD: 7 % (ref 3–10)
NEUTS SEG # BLD: 7.7 K/UL (ref 1.8–8)
NEUTS SEG NFR BLD: 70 % (ref 40–73)
PLATELET # BLD AUTO: 301 K/UL (ref 135–420)
PMV BLD AUTO: 9.5 FL (ref 9.2–11.8)
POTASSIUM SERPL-SCNC: 4.3 MMOL/L (ref 3.5–5.5)
PROT SERPL-MCNC: 7.4 G/DL (ref 6.4–8.2)
PROTHROMBIN TIME: 13.7 SEC (ref 11.5–15.2)
RBC # BLD AUTO: 4.11 M/UL (ref 4.2–5.3)
SODIUM SERPL-SCNC: 140 MMOL/L (ref 136–145)
WBC # BLD AUTO: 11 K/UL (ref 4.6–13.2)

## 2019-08-14 PROCEDURE — 85730 THROMBOPLASTIN TIME PARTIAL: CPT

## 2019-08-14 PROCEDURE — 80053 COMPREHEN METABOLIC PANEL: CPT

## 2019-08-14 PROCEDURE — 85610 PROTHROMBIN TIME: CPT

## 2019-08-14 PROCEDURE — 36415 COLL VENOUS BLD VENIPUNCTURE: CPT

## 2019-08-14 PROCEDURE — 85025 COMPLETE CBC W/AUTO DIFF WBC: CPT

## 2019-08-14 NOTE — H&P
PATIENT: Gwynneth Homans  YOB: 1945  DATE: 08/13/2019 1:45 PM   VISIT TYPE: Office Visit  HISTORIAN: self  _____________________________________________________________    Completed Orders (this encounter)  Order Interpretation Result Next Lab Date   Dietary management education, guidance, and counseling        Assessment/Plan  # Detail Type Description    1. Assessment Calculus of gallbladder w chronic cholecyst w/o obstruction (K80.10). Patient Plan  discussed the pathophysiology of gallstone disease, stones will not resolve spontaneously, discussed the risks of surgery versus no surgery, discussed choledocolithiasis, gallstone pancreatitis and acute cholecystitis, she is very sx and suggest more urgent lap ann         2. Assessment Lung nodules (R91.8). Impression not noted on xrays in the past, this will need evaluation post op, ? asbestosis. Patient Plan pulmonary evaluation         3. Assessment Pancreatic cyst (K86.2). Impression likely benign. Patient Plan will w/u after lap ann         4. Assessment Pelvic mass in female (R19.00). Impression she has had complete hysterectomy, ? bladder diverticulum. Patient Plan w/u after lap ann         5. Assessment Body mass index (BMI) 34.0-34.9, adult (Z68.34). Plan Orders Today's instructions / counseling include(s) Dietary management education, guidance, and counseling. This 76year old female presents for Gallbladder Disease. History of Present Illness:  1. Gallbladder Disease   Onset was 3 weeks ago. Severity: moderate-severe. The problem is worse. It occurs constantly. Location is epigastric and RUQ. There is radiation to back and lower abdomen. The patient describes it as colicky and sharp. Context includes after meals. Identified risk factors include age > 61 and pregnancy. Symptom is aggravated by fatty foods and Mostly anything. Relieving factors include analgesics.   Additional information: 76 year old patient is being seen for gallstones. The patient stated that before she went to the emergency room she kept belching. 8mm stone/polyp GB neck, no jaundice, no f/c. PROBLEM LIST:   Problem List reviewed. Problem Description Onset Date Chronic Clinical Status Notes   Depressive disorder 2013 Y     Hyperlipidemia 2013 Y     Vitamin D deficiency 2013 Y     Gastro-esophageal reflux disease with esophagitis 2013 Y     Benign essential hypertension 2013 Y     Asthma 2013 Y     Organic sleep apnea 2013 Y           PAST MEDICAL/SURGICAL HISTORY  (Detailed)    Disease/disorder Onset Date Management Date Comments     Hysterectomy       Knee surgery     Asthma       Sleep apnea         Family History  (Detailed)  Relationship Family Member Name  Age at Death Condition Onset Age Cause of Death   Father  Y  Heart disease  Y   Father  Y    N       Social History:  (Detailed)  Tobacco use reviewed. Preferred language is [place]. MARITAL STATUS/FAMILY/SOCIAL SUPPORT  Currently . Tobacco use status: Current non-smoker. Smoking status: Never smoker. SMOKING STATUS  Use Status Type Smoking Status Usage Per Day Years Used Total Pack Years   no/never  Never smoker        VAPING USE  Screened for vaping? Yes  Status: Not a current user    TOBACCO/VAPING EXPOSURE  No passive vaping exposure. There is passive smoke exposure. ALCOHOL  There is no history of alcohol use. CAFFEINE  The patient uses caffeine: coffee - 2 cups a day. LIFESTYLE  Moderate activity level. Exercises 3-4 times a week. DIET  healthy.         Medications (active prior to today)  Medication Instructions Start Date Stop Date Refilled Elsewhere   Prolia 60 mg/mL subcutaneous syringe inject 1 milliliter by subcutaneous route  every 6 months in the upper arm, upper thigh or abdomen 10/04/2016   N   Shingrix (PF) 50 mcg/0.5 mL intramuscular suspension inject 0.5 milliliter by intramuscular route once 08/01/2018   N   Trelegy Ellipta 100 mcg-62.5 mcg-25 mcg powder for inhalation inhale 1 puff by inhalation route  every day at the same time each day 02/06/2019   N   CITALOPRAM HYDROBROMIDE 20 MG Tablet TAKE 1 TABLET EVERY DAY 03/05/2019 03/05/2019 N   TRIAMTERENE/HYDROCHLOROTHIAZIDE 37.5-25 MG Tablet TAKE 1 TO 2 TABLETS EVERY DAY 03/05/2019 03/05/2019 N   SIMVASTATIN 20 MG Tablet TAKE 1 TABLET EVERY DAY 03/05/2019 03/05/2019 N   montelukast 5 mg chewable tablet chew 1 tablet by oral route  every day in the evening 06/11/2019 06/11/2019 N   amlodipine 10 mg-benazepril 20 mg capsule TAKE 1 CAPSULE EVERY DAY 06/11/2019 06/11/2019 N   omeprazole 20 mg capsule,delayed release TAKE 1 CAPSULE EVERY DAY BEFORE A MEAL 06/11/2019 06/11/2019 N   ProAir HFA 90 mcg/actuation aerosol inhaler inhale 2 puff by inhalation route  every 4 - 6 hours as needed 07/30/2019 07/30/2019 N   Guaifenesin AC 10 mg-100 mg/5 mL oral liquid take 10 milliliter by oral route  every 4-6 hours as needed for cough 08/01/2019 08/01/2019 N     Patient Status   Completed with information received for patient transitioning into care. Medication Reconciliation  Medications reconciled today.   Medication Reviewed  Adherence Medication Name Sig Desc Elsewhere Status   taking as directed montelukast 5 mg chewable tablet chew 1 tablet by oral route  every day in the evening N Verified   taking as directed SIMVASTATIN 20 MG Tablet TAKE 1 TABLET EVERY DAY N Verified   taking as directed amlodipine 10 mg-benazepril 20 mg capsule TAKE 1 CAPSULE EVERY DAY N Verified   taking as directed Trelegy Ellipta 100 mcg-62.5 mcg-25 mcg powder for inhalation inhale 1 puff by inhalation route  every day at the same time each day N Verified   taking as directed TRIAMTERENE/HYDROCHLOROTHIAZIDE 37.5-25 MG Tablet TAKE 1 TO 2 TABLETS EVERY DAY N Verified   taking as directed omeprazole 20 mg capsule,delayed release TAKE 1 CAPSULE EVERY DAY BEFORE A MEAL N Verified   taking as directed CITALOPRAM HYDROBROMIDE 20 MG Tablet TAKE 1 TABLET EVERY DAY N Verified   taking as directed Guaifenesin AC 10 mg-100 mg/5 mL oral liquid take 10 milliliter by oral route  every 4-6 hours as needed for cough N Verified   taking as directed Prolia 60 mg/mL subcutaneous syringe inject 1 milliliter by subcutaneous route  every 6 months in the upper arm, upper thigh or abdomen N Verified   taking as directed Shingrix (PF) 50 mcg/0.5 mL intramuscular suspension inject 0.5 milliliter by intramuscular route once N Verified   taking as directed ProAir HFA 90 mcg/actuation aerosol inhaler inhale 2 puff by inhalation route  every 4 - 6 hours as needed N Verified     Allergies:  Ingredient Reaction (Severity) Medication Name Comment   RISEDRONATE SODIUM  Actonel    Reviewed, no changes. Review of Systems  System Neg/Pos Details   Constitutional Negative Fever, Night sweats and Weight loss. ENMT Positive Hoarseness, Tinnitus. ENMT Negative Hearing loss, Vertigo and Voice change. Eyes Negative Diplopia and Vision loss. Respiratory Positive Asthma, Cough, Dyspnea. Respiratory Negative Hemoptysis, Known TB exposure and Wheezing. Cardio Positive Edema. Cardio Negative Chest pain, Claudication, Irregular heartbeat/palpitations and Thrombophlebitis. GI Positive Abdominal pain, Reflux. GI Negative Bloating, Dysphagia, Hemorrhoids and Jaundice.  Positive Passage stone/gravel.  Negative Dysuria, Nocturia and Urinary incontinence. Endocrine Negative Cold intolerance and Goiter. Neuro Negative Headache and Syncope. Integumentary Negative Change in shape/size of mole(s) and Skin lesion. MS Positive Bone/joint symptoms. MS Negative Back pain. Hema/Lymph Positive Easy bleeding, Easy bruising. Allergic/Immuno Negative Contact allergy and Contact dermatitis.    Reproductive Positive The patient is post-menopausal.     Vital Signs     Gynecologic History  Patient is postmenopausal.      Height  Time ft in cm Last Measured Height Position   1:45 PM 5.0 1.00 154.94 08/13/2019 Standing     Weight/BSA/BMI  Time lb oz kg Context BMI kg/m2 BSA m2   1:45 .40  82.735 dressed with shoes 34.46      Blood Pressure  Time BP mm/Hg Position Side Site Method Cuff Size   1:45 /42 sitting right arm manual adult large     Temperature/Pulse/Respiration  Time Temp F Temp C Temp Site Pulse/min Pattern Resp/ min   1:45 PM 98.20 36.78 oral 67  14     Pulse Oximetry/FIO2  Time Pulse Ox (Rest %) Pulse Ox (Amb %) O2 Sat O2 L/Min Timing FiO2 % L/min Delivery Method Finger Probe   1:45 PM 98  RA      L Index     Pain Scale  Time Pain Score Method   1:45 PM 6/10 Numeric Pain Intensity Scale     Measured By  Time Measured by   1:45 PM Mark Blanco       Physical Exam:  Exam Findings Details   Constitutional * Level of distress - moderate distress, due to pain. Nourishment - overweight. Overall appearance - chronically ill-appearing. Eyes Normal General - Right: Normal, Left: Normal. Sclera - Right: Normal, Left: Normal.   Neck Exam Normal Inspection - Normal. Palpation - Normal.   Lymph Detail Normal Anterior cervical. Posterior cervical.   Respiratory * Cough - hacking. Respiratory Normal Effort - Normal.   Cardiovascular Normal Inspection - JVD: Absent. Heart rate - Regular rate. Rhythm - Regular. Abdomen * Obese. Abdominal tenderness - RUQ. Abdomen Normal No hepatic enlargement. No spleen enlargement. No hernia. No ascites. No palpable mass. Skin * Inspection - General inspection: warm and dry, anicteric. Musculoskeletal Normal Visual overview of all four extremities is normal. Gait - Normal.   Extremity Normal No Cyanosis. No Edema. Neurological Normal Level of consciousness - Normal. Orientation - Normal. Memory - Normal.   Psychiatric Normal Orientation - Oriented to time, place, person & situation. No agitation. Not anxious. Appropriate mood and affect. Patient Education  # Patient Education   1. Cholecystectomy: Before Your Surgery   2. Cholecystectomy: What to Expect at [facility]   3.  Learning About Cholecystectomy     Medications (added, continued, or stopped this visit):  Start Date Medication Directions PRN Status PRN Reason Instruction Stop Date   06/11/2019 amlodipine 10 mg-benazepril 20 mg capsule TAKE 1 CAPSULE EVERY DAY N      03/05/2019 CITALOPRAM HYDROBROMIDE 20 MG Tablet TAKE 1 TABLET EVERY DAY N      08/01/2019 Guaifenesin AC 10 mg-100 mg/5 mL oral liquid take 10 milliliter by oral route  every 4-6 hours as needed for cough N      06/11/2019 montelukast 5 mg chewable tablet chew 1 tablet by oral route  every day in the evening N      06/11/2019 omeprazole 20 mg capsule,delayed release TAKE 1 CAPSULE EVERY DAY BEFORE A MEAL N      07/30/2019 ProAir HFA 90 mcg/actuation aerosol inhaler inhale 2 puff by inhalation route  every 4 - 6 hours as needed N      10/04/2016 Prolia 60 mg/mL subcutaneous syringe inject 1 milliliter by subcutaneous route  every 6 months in the upper arm, upper thigh or abdomen N      08/01/2018 Shingrix (PF) 50 mcg/0.5 mL intramuscular suspension inject 0.5 milliliter by intramuscular route once N      03/05/2019 SIMVASTATIN 20 MG Tablet TAKE 1 TABLET EVERY DAY N      02/06/2019 Trelegy Ellipta 100 mcg-62.5 mcg-25 mcg powder for inhalation inhale 1 puff by inhalation route  every day at the same time each day N      03/05/2019 TRIAMTERENE/HYDROCHLOROTHIAZIDE 37.5-25 MG Tablet TAKE 1 TO 2 TABLETS EVERY DAY N          Active Patient Care Team Members    Name Contact Agency Type Support Role Relationship Active Date Inactive Date Specialty   Morenita Dickson   encounter provider    Pulmonary Medicine   Rohith Ramires   encounter provider    [address] [facility]       Ophthalmology   Luciano Llamas   Patient provider PCP   [address] [facility]       Dermatology       Provider: Severiano Pennant Leta Carrillo MD 08/13/2019 01:45 PM  Document generated by:  Pamela Ceja 08/14/2019 03:35 PM

## 2019-08-15 ENCOUNTER — ANESTHESIA (OUTPATIENT)
Dept: SURGERY | Age: 74
End: 2019-08-15
Payer: MEDICARE

## 2019-08-15 ENCOUNTER — HOSPITAL ENCOUNTER (OUTPATIENT)
Age: 74
Setting detail: OBSERVATION
Discharge: HOME | End: 2019-08-16
Attending: SURGERY | Admitting: SURGERY
Payer: MEDICARE

## 2019-08-15 ENCOUNTER — APPOINTMENT (OUTPATIENT)
Dept: GENERAL RADIOLOGY | Age: 74
End: 2019-08-15
Attending: SURGERY
Payer: MEDICARE

## 2019-08-15 DIAGNOSIS — K81.9 CHOLECYSTITIS: Primary | ICD-10-CM

## 2019-08-15 PROBLEM — R06.00 DYSPNEA: Status: ACTIVE | Noted: 2019-08-15

## 2019-08-15 PROBLEM — R06.03 RESPIRATORY DIFFICULTY: Status: ACTIVE | Noted: 2019-08-15

## 2019-08-15 PROCEDURE — 77030020255 HC SOL INJ LR 1000ML BG: Performed by: SURGERY

## 2019-08-15 PROCEDURE — 74011000250 HC RX REV CODE- 250: Performed by: SURGERY

## 2019-08-15 PROCEDURE — 74011250637 HC RX REV CODE- 250/637: Performed by: SURGERY

## 2019-08-15 PROCEDURE — 74011000250 HC RX REV CODE- 250

## 2019-08-15 PROCEDURE — 77030010030: Performed by: SURGERY

## 2019-08-15 PROCEDURE — 77030008683 HC TU ET CUF COVD -A: Performed by: ANESTHESIOLOGY

## 2019-08-15 PROCEDURE — 77030002933 HC SUT MCRYL J&J -A: Performed by: SURGERY

## 2019-08-15 PROCEDURE — 99218 HC RM OBSERVATION: CPT

## 2019-08-15 PROCEDURE — 74011250636 HC RX REV CODE- 250/636

## 2019-08-15 PROCEDURE — 77030008518 HC TBNG INSUF ENDO STRY -B: Performed by: SURGERY

## 2019-08-15 PROCEDURE — 77030013079 HC BLNKT BAIR HGGR 3M -A: Performed by: ANESTHESIOLOGY

## 2019-08-15 PROCEDURE — 77030018836 HC SOL IRR NACL ICUM -A: Performed by: SURGERY

## 2019-08-15 PROCEDURE — 76210000003 HC OR PH I REC 3.5 TO 4 HR: Performed by: SURGERY

## 2019-08-15 PROCEDURE — 77030020053 HC ELECTRD LAPSCP COVD -B: Performed by: SURGERY

## 2019-08-15 PROCEDURE — 88304 TISSUE EXAM BY PATHOLOGIST: CPT

## 2019-08-15 PROCEDURE — 76010000138 HC OR TIME 0.5 TO 1 HR: Performed by: SURGERY

## 2019-08-15 PROCEDURE — 74011250636 HC RX REV CODE- 250/636: Performed by: SURGERY

## 2019-08-15 PROCEDURE — 77030008602 HC TRCR ENDOSC EPATH J&J -B: Performed by: SURGERY

## 2019-08-15 PROCEDURE — 77030008477 HC STYL SATN SLP COVD -A: Performed by: ANESTHESIOLOGY

## 2019-08-15 PROCEDURE — 74011000250 HC RX REV CODE- 250: Performed by: ANESTHESIOLOGY

## 2019-08-15 PROCEDURE — 71045 X-RAY EXAM CHEST 1 VIEW: CPT

## 2019-08-15 PROCEDURE — 77030018875 HC APPL CLP LIG4 J&J -B: Performed by: SURGERY

## 2019-08-15 PROCEDURE — 77030031139 HC SUT VCRL2 J&J -A: Performed by: SURGERY

## 2019-08-15 PROCEDURE — 77030003580 HC NDL INSUF VERES J&J -B: Performed by: SURGERY

## 2019-08-15 PROCEDURE — 74011250636 HC RX REV CODE- 250/636: Performed by: ANESTHESIOLOGY

## 2019-08-15 PROCEDURE — 77030040361 HC SLV COMPR DVT MDII -B: Performed by: SURGERY

## 2019-08-15 PROCEDURE — 77030020782 HC GWN BAIR PAWS FLX 3M -B: Performed by: SURGERY

## 2019-08-15 PROCEDURE — 77030008603 HC TRCR ENDOSC EPATH J&J -C: Performed by: SURGERY

## 2019-08-15 PROCEDURE — 76060000032 HC ANESTHESIA 0.5 TO 1 HR: Performed by: SURGERY

## 2019-08-15 RX ORDER — NEOSTIGMINE METHYLSULFATE 5 MG/5 ML
SYRINGE (ML) INTRAVENOUS AS NEEDED
Status: DISCONTINUED | OUTPATIENT
Start: 2019-08-15 | End: 2019-08-15 | Stop reason: HOSPADM

## 2019-08-15 RX ORDER — MONTELUKAST SODIUM 4 MG/1
4 TABLET, CHEWABLE ORAL
Status: DISCONTINUED | OUTPATIENT
Start: 2019-08-15 | End: 2019-08-16 | Stop reason: HOSPADM

## 2019-08-15 RX ORDER — SODIUM CHLORIDE 0.9 % (FLUSH) 0.9 %
5-40 SYRINGE (ML) INJECTION AS NEEDED
Status: DISCONTINUED | OUTPATIENT
Start: 2019-08-15 | End: 2019-08-15 | Stop reason: HOSPADM

## 2019-08-15 RX ORDER — LIDOCAINE HYDROCHLORIDE 20 MG/ML
INJECTION, SOLUTION EPIDURAL; INFILTRATION; INTRACAUDAL; PERINEURAL AS NEEDED
Status: DISCONTINUED | OUTPATIENT
Start: 2019-08-15 | End: 2019-08-15 | Stop reason: HOSPADM

## 2019-08-15 RX ORDER — SODIUM CHLORIDE 0.9 % (FLUSH) 0.9 %
5-40 SYRINGE (ML) INJECTION EVERY 8 HOURS
Status: DISCONTINUED | OUTPATIENT
Start: 2019-08-15 | End: 2019-08-15 | Stop reason: HOSPADM

## 2019-08-15 RX ORDER — MIDAZOLAM HYDROCHLORIDE 1 MG/ML
INJECTION, SOLUTION INTRAMUSCULAR; INTRAVENOUS AS NEEDED
Status: DISCONTINUED | OUTPATIENT
Start: 2019-08-15 | End: 2019-08-15 | Stop reason: HOSPADM

## 2019-08-15 RX ORDER — ALBUTEROL SULFATE 0.83 MG/ML
2.5 SOLUTION RESPIRATORY (INHALATION)
Status: COMPLETED | OUTPATIENT
Start: 2019-08-15 | End: 2019-08-15

## 2019-08-15 RX ORDER — AMLODIPINE BESYLATE 5 MG/1
20 TABLET ORAL DAILY
Status: DISCONTINUED | OUTPATIENT
Start: 2019-08-16 | End: 2019-08-16 | Stop reason: HOSPADM

## 2019-08-15 RX ORDER — OXYCODONE AND ACETAMINOPHEN 5; 325 MG/1; MG/1
1 TABLET ORAL
Status: DISCONTINUED | OUTPATIENT
Start: 2019-08-15 | End: 2019-08-16 | Stop reason: HOSPADM

## 2019-08-15 RX ORDER — DEXAMETHASONE SODIUM PHOSPHATE 4 MG/ML
INJECTION, SOLUTION INTRA-ARTICULAR; INTRALESIONAL; INTRAMUSCULAR; INTRAVENOUS; SOFT TISSUE AS NEEDED
Status: DISCONTINUED | OUTPATIENT
Start: 2019-08-15 | End: 2019-08-15 | Stop reason: HOSPADM

## 2019-08-15 RX ORDER — METOCLOPRAMIDE HYDROCHLORIDE 5 MG/ML
INJECTION INTRAMUSCULAR; INTRAVENOUS AS NEEDED
Status: DISCONTINUED | OUTPATIENT
Start: 2019-08-15 | End: 2019-08-15 | Stop reason: HOSPADM

## 2019-08-15 RX ORDER — GLYCOPYRROLATE 0.2 MG/ML
INJECTION INTRAMUSCULAR; INTRAVENOUS AS NEEDED
Status: DISCONTINUED | OUTPATIENT
Start: 2019-08-15 | End: 2019-08-15 | Stop reason: HOSPADM

## 2019-08-15 RX ORDER — HYDROMORPHONE HYDROCHLORIDE 2 MG/ML
0.5 INJECTION, SOLUTION INTRAMUSCULAR; INTRAVENOUS; SUBCUTANEOUS
Status: DISCONTINUED | OUTPATIENT
Start: 2019-08-15 | End: 2019-08-15

## 2019-08-15 RX ORDER — ONDANSETRON 2 MG/ML
4 INJECTION INTRAMUSCULAR; INTRAVENOUS
Status: DISCONTINUED | OUTPATIENT
Start: 2019-08-15 | End: 2019-08-16 | Stop reason: HOSPADM

## 2019-08-15 RX ORDER — ROCURONIUM BROMIDE 10 MG/ML
INJECTION, SOLUTION INTRAVENOUS AS NEEDED
Status: DISCONTINUED | OUTPATIENT
Start: 2019-08-15 | End: 2019-08-15 | Stop reason: HOSPADM

## 2019-08-15 RX ORDER — FENTANYL CITRATE 50 UG/ML
INJECTION, SOLUTION INTRAMUSCULAR; INTRAVENOUS AS NEEDED
Status: DISCONTINUED | OUTPATIENT
Start: 2019-08-15 | End: 2019-08-15 | Stop reason: HOSPADM

## 2019-08-15 RX ORDER — ONDANSETRON 2 MG/ML
INJECTION INTRAMUSCULAR; INTRAVENOUS AS NEEDED
Status: DISCONTINUED | OUTPATIENT
Start: 2019-08-15 | End: 2019-08-15 | Stop reason: HOSPADM

## 2019-08-15 RX ORDER — SIMVASTATIN 20 MG/1
20 TABLET, FILM COATED ORAL
Status: DISCONTINUED | OUTPATIENT
Start: 2019-08-15 | End: 2019-08-16 | Stop reason: HOSPADM

## 2019-08-15 RX ORDER — BUPIVACAINE HYDROCHLORIDE 5 MG/ML
INJECTION, SOLUTION EPIDURAL; INTRACAUDAL AS NEEDED
Status: DISCONTINUED | OUTPATIENT
Start: 2019-08-15 | End: 2019-08-15 | Stop reason: HOSPADM

## 2019-08-15 RX ORDER — CITALOPRAM 20 MG/1
20 TABLET, FILM COATED ORAL DAILY
Status: DISCONTINUED | OUTPATIENT
Start: 2019-08-16 | End: 2019-08-16 | Stop reason: HOSPADM

## 2019-08-15 RX ORDER — OXYCODONE AND ACETAMINOPHEN 5; 325 MG/1; MG/1
1 TABLET ORAL
Qty: 20 TAB | Refills: 0 | Status: SHIPPED | OUTPATIENT
Start: 2019-08-15 | End: 2019-08-18

## 2019-08-15 RX ORDER — TRIAMTERENE/HYDROCHLOROTHIAZID 37.5-25 MG
1 TABLET ORAL DAILY
Status: DISCONTINUED | OUTPATIENT
Start: 2019-08-16 | End: 2019-08-16 | Stop reason: HOSPADM

## 2019-08-15 RX ORDER — SODIUM CHLORIDE, SODIUM LACTATE, POTASSIUM CHLORIDE, CALCIUM CHLORIDE 600; 310; 30; 20 MG/100ML; MG/100ML; MG/100ML; MG/100ML
125 INJECTION, SOLUTION INTRAVENOUS CONTINUOUS
Status: DISCONTINUED | OUTPATIENT
Start: 2019-08-15 | End: 2019-08-15 | Stop reason: HOSPADM

## 2019-08-15 RX ORDER — PROPOFOL 10 MG/ML
INJECTION, EMULSION INTRAVENOUS AS NEEDED
Status: DISCONTINUED | OUTPATIENT
Start: 2019-08-15 | End: 2019-08-15 | Stop reason: HOSPADM

## 2019-08-15 RX ORDER — SODIUM CHLORIDE, SODIUM LACTATE, POTASSIUM CHLORIDE, CALCIUM CHLORIDE 600; 310; 30; 20 MG/100ML; MG/100ML; MG/100ML; MG/100ML
125 INJECTION, SOLUTION INTRAVENOUS CONTINUOUS
Status: DISCONTINUED | OUTPATIENT
Start: 2019-08-15 | End: 2019-08-16 | Stop reason: HOSPADM

## 2019-08-15 RX ORDER — HYDROMORPHONE HYDROCHLORIDE 2 MG/ML
0.5 INJECTION, SOLUTION INTRAMUSCULAR; INTRAVENOUS; SUBCUTANEOUS
Status: DISCONTINUED | OUTPATIENT
Start: 2019-08-15 | End: 2019-08-15 | Stop reason: HOSPADM

## 2019-08-15 RX ORDER — FENTANYL CITRATE 50 UG/ML
25 INJECTION, SOLUTION INTRAMUSCULAR; INTRAVENOUS AS NEEDED
Status: DISCONTINUED | OUTPATIENT
Start: 2019-08-15 | End: 2019-08-15 | Stop reason: HOSPADM

## 2019-08-15 RX ADMIN — SODIUM CHLORIDE, SODIUM LACTATE, POTASSIUM CHLORIDE, AND CALCIUM CHLORIDE: 600; 310; 30; 20 INJECTION, SOLUTION INTRAVENOUS at 13:45

## 2019-08-15 RX ADMIN — FENTANYL CITRATE 25 MCG: 50 INJECTION, SOLUTION INTRAMUSCULAR; INTRAVENOUS at 15:28

## 2019-08-15 RX ADMIN — GLYCOPYRROLATE 0.2 MG: 0.2 INJECTION INTRAMUSCULAR; INTRAVENOUS at 13:05

## 2019-08-15 RX ADMIN — FENTANYL CITRATE 25 MCG: 50 INJECTION, SOLUTION INTRAMUSCULAR; INTRAVENOUS at 15:22

## 2019-08-15 RX ADMIN — MIDAZOLAM HYDROCHLORIDE 2 MG: 1 INJECTION, SOLUTION INTRAMUSCULAR; INTRAVENOUS at 13:05

## 2019-08-15 RX ADMIN — ONDANSETRON 4 MG: 2 INJECTION INTRAMUSCULAR; INTRAVENOUS at 13:05

## 2019-08-15 RX ADMIN — OXYCODONE HYDROCHLORIDE AND ACETAMINOPHEN 1 TABLET: 5; 325 TABLET ORAL at 23:27

## 2019-08-15 RX ADMIN — Medication 3 MG: at 13:40

## 2019-08-15 RX ADMIN — FENTANYL CITRATE 25 MCG: 50 INJECTION, SOLUTION INTRAMUSCULAR; INTRAVENOUS at 16:58

## 2019-08-15 RX ADMIN — ALBUTEROL SULFATE 2.5 MG: 2.5 SOLUTION RESPIRATORY (INHALATION) at 15:20

## 2019-08-15 RX ADMIN — SIMVASTATIN 20 MG: 20 TABLET, FILM COATED ORAL at 23:27

## 2019-08-15 RX ADMIN — ROCURONIUM BROMIDE 30 MG: 10 INJECTION, SOLUTION INTRAVENOUS at 13:11

## 2019-08-15 RX ADMIN — DEXAMETHASONE SODIUM PHOSPHATE 4 MG: 4 INJECTION, SOLUTION INTRA-ARTICULAR; INTRALESIONAL; INTRAMUSCULAR; INTRAVENOUS; SOFT TISSUE at 13:05

## 2019-08-15 RX ADMIN — OXYCODONE HYDROCHLORIDE AND ACETAMINOPHEN 1 TABLET: 5; 325 TABLET ORAL at 18:57

## 2019-08-15 RX ADMIN — FENTANYL CITRATE 25 MCG: 50 INJECTION, SOLUTION INTRAMUSCULAR; INTRAVENOUS at 14:35

## 2019-08-15 RX ADMIN — LIDOCAINE HYDROCHLORIDE 100 MG: 20 INJECTION, SOLUTION EPIDURAL; INFILTRATION; INTRACAUDAL; PERINEURAL at 13:11

## 2019-08-15 RX ADMIN — PROPOFOL 100 MG: 10 INJECTION, EMULSION INTRAVENOUS at 13:11

## 2019-08-15 RX ADMIN — GLYCOPYRROLATE 0.6 MG: 0.2 INJECTION INTRAMUSCULAR; INTRAVENOUS at 13:40

## 2019-08-15 RX ADMIN — FENTANYL CITRATE 100 MCG: 50 INJECTION, SOLUTION INTRAMUSCULAR; INTRAVENOUS at 13:05

## 2019-08-15 RX ADMIN — SODIUM CHLORIDE, SODIUM LACTATE, POTASSIUM CHLORIDE, AND CALCIUM CHLORIDE 125 ML/HR: 600; 310; 30; 20 INJECTION, SOLUTION INTRAVENOUS at 12:08

## 2019-08-15 RX ADMIN — METOCLOPRAMIDE HYDROCHLORIDE 10 MG: 5 INJECTION INTRAMUSCULAR; INTRAVENOUS at 13:11

## 2019-08-15 NOTE — ANESTHESIA PREPROCEDURE EVALUATION
Relevant Problems   No relevant active problems       Anesthetic History   No history of anesthetic complications            Review of Systems / Medical History  Patient summary reviewed, nursing notes reviewed and pertinent labs reviewed    Pulmonary        Sleep apnea    Asthma        Neuro/Psych         Psychiatric history     Cardiovascular    Hypertension              Exercise tolerance: >4 METS     GI/Hepatic/Renal     GERD           Endo/Other        Arthritis     Other Findings              Physical Exam    Airway  Mallampati: III  TM Distance: 4 - 6 cm  Neck ROM: normal range of motion   Mouth opening: Normal     Cardiovascular  Regular rate and rhythm,  S1 and S2 normal,  no murmur, click, rub, or gallop  Rhythm: regular  Rate: normal         Dental    Dentition: Full upper dentures and Lower partial plate     Pulmonary  Breath sounds clear to auscultation               Abdominal  GI exam deferred       Other Findings            Anesthetic Plan    ASA: 3  Anesthesia type: general          Induction: Intravenous  Anesthetic plan and risks discussed with: Patient and Spouse

## 2019-08-15 NOTE — PERIOP NOTES
Reviewed PTA medication list with patient/caregiver and patient/caregiver denies any additional medications. Patient admits to having a responsible adult care for them for at least 24 hours after surgery.     Dual skin assessment completed by Jake HALE and Lay Reed RN.

## 2019-08-15 NOTE — CONSULTS
Medicine Consult    Patient:  Haydee Cruz 76 y.o. female  Asked to evaluate patient by Dr. Rose Sepulveda  Primary Care Provider: Fabian Jiménez MD  Date of Admission:  8/15/2019  Reason for Consult: hypoxia        Assessment/Plan     Patient Active Problem List   Diagnosis Code    Respiratory difficulty R06.03    Dyspnea R06.00    Sleep apnea G47.30    HTN (hypertension) I10    Asthma J45.909       PLAN:      -Monitor hemodynamics and hemoglobin in and postoperative. -Monitor kidney function. Avoid nephrotoxins. Gentle hydration.  -Continue blood pressure medications. Monitor for hypotension. If that is  to present, we may need to hold some or all of her blood pressure  medications.  -respiratory distress - CXR with atelectasis. Encourage incentive spirometry  -MARY : CPAP at night.  -Asthma : neb treatment as needed.  -Continue lipid lowering therapy.  -Routine postoperative management, pain control, and deep venous  thrombosis per admitting team.    Thank you for allowing us to participate in this patients care. HPI:   CC: Haydee Cruz is a 76 y.o. female with past medical history significant for asthma admitted post op GB surgery. Post op she was in respiratory distress and needed to be placed on oxygen by NC. On my evaluation she is breathing fine on oxygen by NC. Her oxygen sats 100% on 3 L. She has MARY and uses CPAP, she has history of asthma and uses rescue inhaler as needed. She denies any chest pain, SOB, N/V.      Past Medical History:   Diagnosis Date    Arthritis     Asthma many years     Cancer ([facility] [address]     skin    Depression     GERD (gastroesophageal reflux disease)     hiatal hernia    HTN (hypertension) many years    Hyperlipidemia     Menopause     Sleep apnea     c-pap     Past Surgical History:   Procedure Laterality Date    HX HEENT  many years ago, and last month    chest and head cancer removed    HX HYSTERECTOMY      Age 27    HX ORTHOPAEDIC      cancer removed from hand    NEUROLOGICAL PROCEDURE UNLISTED  1999    neck      Social History     Tobacco Use    Smoking status: Never Smoker    Smokeless tobacco: Never Used   Substance Use Topics    Alcohol use: No    Drug use: Never     History reviewed. No pertinent family history. No current facility-administered medications on file prior to encounter. Current Outpatient Medications on File Prior to Encounter   Medication Sig Dispense Refill    metoclopramide HCl (REGLAN) 10 mg tablet Take 1 Tab by mouth every six (6) hours as needed (biliary colic). 20 Tab 1    ondansetron hcl (ZOFRAN) 4 mg tablet Take 1 Tab by mouth every eight (8) hours as needed for Nausea. 12 Tab 0    amLODIPine (NORVASC) 10 mg tablet Take 20 mg by mouth daily.  triamterene-hydroCHLOROthiazide (MAXZIDE) 37.5-25 mg per tablet Take  by mouth daily.  simvastatin (ZOCOR) 20 mg tablet Take 20 mg by mouth nightly.  calcium carbonate (OS-ABBEY) 500 mg calcium (1,250 mg) tablet Take  by mouth daily.  cholecalciferol (VITAMIN D3) 1,000 unit cap Take  by mouth daily.  omeprazole (PRILOSEC) 20 mg capsule Take 20 mg by mouth daily.  denosumab (PROLIA) 60 mg/mL injection 60 mg by SubCUTAneous route.         Allergies   Allergen Reactions    Actonel [Risedronate] Hives    Metamucil [Psyllium Husk] Hives           Review of Systems  Constitutional: No fever, chills, diaphoresis, malaise, fatigue or weight gain/loss or falls  Skin: no itching or rashes  HEENT: no ear discomfort, hearing loss, tinnitus, epistaxis or sore throat  EYES: no blurry vision, double vision or photophobia  CARDIOVASCULAR: no claudication, cp, palpitations, orthopnea, pnd or LE edema  PULMONARY: see above  GI: no nausea, vomiting, diarrhea, abdominal pain, melena, hematemesis or brbpr  : no dysuria, hematuria  MUSCULOSKELETAL: no back pain, joint pain or myalgias  ENDOCRINE: no heat/cold intolerance, polyuria or polydipsia  HEME: no easy bruising or bleeding  NEURO: no unilateral weakness, numbness, tingling or seizures      Physical Exam:      Visit Vitals  /48 (BP 1 Location: Left arm, BP Patient Position: At rest)   Pulse 73   Temp 97.6 °F (36.4 °C)   Resp 18   Ht 5' 1\" (1.549 m)   Wt 82.6 kg (182 lb 3 oz)   SpO2 95%   BMI 34.42 kg/m²     Body mass index is 34.42 kg/m². Physical Exam:  GEN: well nourished, laying in bed in no acute distress  HEENT: atraumatic, nose normal,oropharynx clear, MMM  NECK: supple, trachea midline, no supraclavicular or submandibular adenopathy noted  EYES: conjuctiva normal, lids with out lesions, PERRL  HEART: RRR with out m/r/g, pmi nondisplaced, pulses 2+ distally  LUNGS: equal chest wall expansion, cta bl with out wheezes/rales or rhonchi  AB: soft, +BS, post op abdomen  NEURO: alert, awake and oriented x3, gait not assessed, cranial nerves intact, strength 5/5 bl UE and LE, sensation intact, reflexes nonpathological  SKIN: dry, intact, warm no breakdown noted        Laboratory Studies:    No results found for this or any previous visit (from the past 24 hour(s)).

## 2019-08-15 NOTE — PROGRESS NOTES
Pt transferred to room 312. Pt stable. Dressing CDI. Jose Alcantara RN at bedside.         08/15/19 1837   Vitals   Temp 98.9 °F (37.2 °C)   Temp Source Axillary   Pulse (Heart Rate) 76   Heart Rate Source Monitor   Resp Rate 20   O2 Sat (%) 99 %   Level of Consciousness Alert   /67   MAP (Calculated) 88   BP 1 Location Left arm   BP 1 Method Automatic   BP Patient Position At rest   MEWS Score 1

## 2019-08-15 NOTE — ROUTINE PROCESS
18:50: Pt received as a transfer from PACU at this time following verbal/bedside change of shift report from Tom Mott RN. Report included SBAR, KARDEX, MAR, and recent results.      Patient Vitals for the past 8 hrs:   Temp Pulse Resp BP SpO2   08/15/19 1900 97.6 °F (36.4 °C) 73 18 132/48 95 %   08/15/19 1837 98.9 °F (37.2 °C) 76 20 130/67 99 %   08/15/19 1823 97.8 °F (36.6 °C) 85 19  97 %   08/15/19 1821  87 20  97 %   08/15/19 1818  87 17  98 %   08/15/19 1815  85 19 149/55 96 %   08/15/19 1800  88 23 142/61 96 %   08/15/19 1759  87 19  96 %   08/15/19 1758  90 17  95 %   08/15/19 1755  87 22  94 %   08/15/19 1747  84 18  94 %   08/15/19 1745  77 19 127/50 94 %   08/15/19 1743     95 %   08/15/19 1731     92 %   08/15/19 1730 97.6 °F (36.4 °C) 89 15 124/52 91 %   08/15/19 1729  82 20  91 %   08/15/19 1728  81 20  91 %   08/15/19 1727  80 20  91 %   08/15/19 1726  84 19  91 %   08/15/19 1725  82 23  91 %   08/15/19 1724  82 22  91 %   08/15/19 1723  81 21  91 %   08/15/19 1722  80 20  92 %   08/15/19 1721  82 22  92 %   08/15/19 1720  82 23  92 %   08/15/19 1719  82 24  92 %   08/15/19 1718  82 25  92 %   08/15/19 1717  84 26  92 %   08/15/19 1716  83 25  93 %   08/15/19 1715  92 20 141/50 92 %   08/15/19 1711  92 18 126/52 93 %   08/15/19 1700  81 23  97 %   08/15/19 1654  75 22  95 %   08/15/19 1653  82 25  94 %   08/15/19 1652  90 19  96 %   08/15/19 1650  78 22  94 %   08/15/19 1646  77 21  96 %   08/15/19 1645  73 21 124/57 97 %   08/15/19 1635  78 20 123/87 95 %   08/15/19 1631  84 14  90 %   08/15/19 1630  85 20 124/62 91 %   08/15/19 1629  82 24  91 %   08/15/19 1628  80 23  91 %   08/15/19 1625  85 19 128/58 91 %   08/15/19 1622  82 19  93 %   08/15/19 1621  87 22  92 %   08/15/19 1620  90 19 133/55 93 %   08/15/19 1619  92 20  95 %   08/15/19 1618  86 17  95 %   08/15/19 1617  87 14  95 %   08/15/19 1616  89 11  95 %   08/15/19 1615  94 16 137/52 (!) 86 %   08/15/19 1610  85 21 113/58 97 %   08/15/19 1600  78 21 117/49 96 %   08/15/19 1545  82 20 111/41 95 %   08/15/19 1536  82 22  94 %   08/15/19 1535  82 20 115/42 95 %   08/15/19 1534  83 20  96 %   08/15/19 1532  83 19 109/45 93 %   08/15/19 1528  88 19  97 %   08/15/19 1527  88 15  100 %   08/15/19 1525  92 16 136/55 99 %   08/15/19 1523  (!) 106 17  95 %   08/15/19 1520  83 18 115/63 93 %   08/15/19 1518  78 20  94 %   08/15/19 1515  89 22 (!) 126/91 91 %   08/15/19 1510  79 20 98/53 91 %   08/15/19 1509  79 23  93 %   08/15/19 1508  80 22  93 %   08/15/19 1507  78 24  94 %   08/15/19 1506  80 23  95 %   08/15/19 1505  85 15 129/54 99 %   08/15/19 1504  83 22  100 %   08/15/19 1503  78 19  100 %   08/15/19 1502  80 21  100 %   08/15/19 1501  86 20  100 %   08/15/19 1500  90 21 94/70 100 %   08/15/19 1454  82 21  100 %   08/15/19 1453  84 20  100 %   08/15/19 1451  94 24  94 %   08/15/19 1450  (!) 109 20 158/60 (!) 84 %   08/15/19 1449  (!) 107 17  (!) 88 %   08/15/19 1448  88 26  92 %   08/15/19 1447  79 22  91 %   08/15/19 1446  75 23  92 %   08/15/19 1445  76 24 130/53 93 %   08/15/19 1440  76 19 127/53 95 %   08/15/19 1435  84 21 134/55 96 %   08/15/19 1430  78 23 134/48 98 %   08/15/19 1421  90 16  99 %   08/15/19 1418  86 17  98 %   08/15/19 1417  86 15  97 %   08/15/19 1416  85 17  96 %   08/15/19 1415  89 23 110/56 92 %   08/15/19 1414  90 21  90 %   08/15/19 1413  89 17  91 %   08/15/19 1412  91 20  91 %   08/15/19 1411  88 16  94 %   08/15/19 1410  86 23 93/75 95 %   08/15/19 1407  91 26  97 %   08/15/19 1405  91 26 139/49 100 %   08/15/19 1404  95 25  100 %   08/15/19 1403  98 25  100 %   08/15/19 1400  99 30 155/56 99 %   08/15/19 1359  (!) 104 30  99 %   08/15/19 1358  (!) 110 24  95 %   08/15/19 1357  (!) 115 23  95 %   08/15/19 1356  (!) 117 25  (!) 76 % 08/15/19 1355  (!) 113 22 161/74 91 %   08/15/19 1354  (!) 107 (!) 31  100 %   08/15/19 1353  99 23  100 %   08/15/19 1352  98 18  100 %   08/15/19 1351  (!) 102 20 180/59 100 %   08/15/19 1350  (!) 107 13  99 %   08/15/19 1349  (!) 109 12 173/57 (!) 87 %   08/15/19 1347 98.9 °F (37.2 °C) (!) 109 14 173/57 95 %   08/15/19 1152 96.9 °F (36.1 °C) 73 16 127/55 96 %       18:50: CONT'D: Pt arrived on unit via bed with reporting RN and transportation staff in attendance. Pt was receiving supplemental 02 @ 4l/m via NC to maintain SP02 above 95%. 02 attached to wall unit upon transfer. Pt presents as an elderly  female who appears her stated age. 4 lap sites to abdomen R/T procedure present as CDI. Pt /O \"pain creeping up again. \"; medicated with Percocet one TAB PO at 18:57. Bedside, Verbal and Written shift change report given to Celia Cook RN (oncoming nurse) by  Juan Manuel Rao nurse). Report included the following information SBAR, Kardex, MAR and Recent Results.

## 2019-08-15 NOTE — ANESTHESIA POSTPROCEDURE EVALUATION
Post-Anesthesia Evaluation and Assessment    Cardiovascular Function/Vital Signs  Visit Vitals  /52   Pulse 89   Temp 36.4 °C (97.6 °F)   Resp 15   Ht 5' 1\" (1.549 m)   Wt 82.6 kg (182 lb 3 oz)   SpO2 95%   BMI 34.42 kg/m²       Patient is status post Procedure(s):  LAPAROSCOPIC CHOLECYSTECTOMY. Nausea/Vomiting: Controlled. Postoperative hydration reviewed and adequate. Pain:  Pain Scale 1: Numeric (0 - 10) (08/15/19 8383)  Pain Intensity 1: 1 (08/15/19 1743)   Managed. Neurological Status:   Neuro (WDL): Exceptions to WDL (08/15/19 8652)   At baseline. Mental Status and Level of Consciousness: Baseline and stable. Pulmonary Status:   O2 Device: Nasal cannula (08/15/19 7369)   Adequate oxygenation and airway patent. Complications related to anesthesia: None    Post-anesthesia assessment completed. No concerns. Patient has met all discharge requirements.     Signed By: Last Salcedo MD

## 2019-08-15 NOTE — PERIOP NOTES
TRANSFER - OUT REPORT:    Verbal report given to Alen Person RN(name) on Evelyn Olmedo  being transferred to [address] [place] [address](unit) for routine post - op       Report consisted of patients Situation, Background, Assessment and   Recommendations(SBAR). Information from the following report(s) OR Summary, Intake/Output and MAR was reviewed with the receiving nurse. Lines:   Peripheral IV 08/15/19 Posterior;Right; Anterior Forearm (Active)   Site Assessment Clean, dry, & intact 8/15/2019 12:08 PM   Phlebitis Assessment 0 8/15/2019 12:08 PM   Infiltration Assessment 0 8/15/2019 12:08 PM   Dressing Status Clean, dry, & intact 8/15/2019 12:08 PM   Dressing Type Transparent 8/15/2019 12:08 PM   Hub Color/Line Status Pink 8/15/2019 12:08 PM        Opportunity for questions and clarification was provided.       Patient transported with:   O2 @ 2 liters  Registered Nurse  [facility]

## 2019-08-15 NOTE — PERIOP NOTES
Patient arrived to pacu and started showing signs of a bronchospasm. Dr. Augustin Burgos came to bedside and assisted with patient. Dr. Augustin Burgos left when patient was stable.

## 2019-08-16 VITALS
BODY MASS INDEX: 37.66 KG/M2 | RESPIRATION RATE: 18 BRPM | HEART RATE: 72 BPM | SYSTOLIC BLOOD PRESSURE: 130 MMHG | HEIGHT: 61 IN | TEMPERATURE: 97.3 F | DIASTOLIC BLOOD PRESSURE: 48 MMHG | WEIGHT: 199.5 LBS | OXYGEN SATURATION: 90 %

## 2019-08-16 PROCEDURE — 77010033678 HC OXYGEN DAILY

## 2019-08-16 PROCEDURE — 74011250637 HC RX REV CODE- 250/637: Performed by: SURGERY

## 2019-08-16 PROCEDURE — 99218 HC RM OBSERVATION: CPT

## 2019-08-16 RX ADMIN — TRIAMTERENE AND HYDROCHLOROTHIAZIDE 1 TABLET: 37.5; 25 TABLET ORAL at 09:30

## 2019-08-16 RX ADMIN — CITALOPRAM HYDROBROMIDE 20 MG: 20 TABLET, FILM COATED ORAL at 09:30

## 2019-08-16 RX ADMIN — OXYCODONE HYDROCHLORIDE AND ACETAMINOPHEN 1 TABLET: 5; 325 TABLET ORAL at 10:40

## 2019-08-16 NOTE — DISCHARGE INSTRUCTIONS
Patient Education     DISCHARGE SUMMARY from Nurse    PATIENT INSTRUCTIONS:    After general anesthesia or intravenous sedation, for 24 hours or while taking prescription Narcotics:  · Limit your activities  · Do not drive and operate hazardous machinery  · Do not make important personal or business decisions  · Do  not drink alcoholic beverages  · If you have not urinated within 8 hours after discharge, please contact your surgeon on call. Report the following to your surgeon:  · Excessive pain, swelling, redness or odor of or around the surgical area  · Temperature over 100.5  · Nausea and vomiting lasting longer than 4 hours or if unable to take medications  · Any signs of decreased circulation or nerve impairment to extremity: change in color, persistent  numbness, tingling, coldness or increase pain  · Any questions    What to do at Home:  Recommended activity: Activity as tolerated,     *  Please give a list of your current medications to your Primary Care Provider. *  Please update this list whenever your medications are discontinued, doses are      changed, or new medications (including over-the-counter products) are added. *  Please carry medication information at all times in case of emergency situations. These are general instructions for a healthy lifestyle:    No smoking/ No tobacco products/ Avoid exposure to second hand smoke  Surgeon General's Warning:  Quitting smoking now greatly reduces serious risk to your health. Obesity, smoking, and sedentary lifestyle greatly increases your risk for illness    A healthy diet, regular physical exercise & weight monitoring are important for maintaining a healthy lifestyle    You may be retaining fluid if you have a history of heart failure or if you experience any of the following symptoms:  Weight gain of 3 pounds or more overnight or 5 pounds in a week, increased swelling in our hands or feet or shortness of breath while lying flat in bed. Please call your doctor as soon as you notice any of these symptoms; do not wait until your next office visit. The discharge information has been reviewed with the patient. The patient verbalized understanding. Discharge medications reviewed with the patient and appropriate educational materials and side effects teaching were provided. ___________________________________________________________________________________________________________________________________     Cholecystectomy: What to Expect at Home  Your Recovery  After your surgery, it is normal to feel weak and tired for several days after you return home. Your belly may be swollen. If you had laparoscopic surgery, you may also have pain in your shoulder for about 24 hours. You may have gas or need to burp a lot at first, and a few people get diarrhea. The diarrhea usually goes away in 2 to 4 weeks, but it may last longer. How quickly you recover depends on whether you had a laparoscopic or open surgery. · For a laparoscopic surgery, most people can go back to work or their normal routine in 1 to 2 weeks, but it may take longer, depending on the type of work you do. · For an open surgery, it will probably take 4 to 6 weeks before you get back to your normal routine. This care sheet gives you a general idea about how long it will take for you to recover. However, each person recovers at a different pace. Follow the steps below to get better as quickly as possible. How can you care for yourself at home? Activity    · Rest when you feel tired. Getting enough sleep will help you recover.     · Try to walk each day. Start out by walking a little more than you did the day before. Gradually increase the amount you walk. Walking boosts blood flow and helps prevent pneumonia and constipation.     · For about 2 to 4 weeks, avoid lifting anything that would make you strain.  This may include a child, heavy grocery bags and milk containers, a heavy briefcase or backpack, cat litter or dog food bags, or a vacuum .     · Avoid strenuous activities, such as biking, jogging, weightlifting, and aerobic exercise, until your doctor says it is okay.     · You may shower 24 to 48 hours after surgery, if your doctor okays it. Pat the cut (incision) dry. Do not take a bath for the first 2 weeks, or until your doctor tells you it is okay.     · You may drive when you are no longer taking pain medicine and can quickly move your foot from the gas pedal to the brake. You must also be able to sit comfortably for a long period of time, even if you do not plan to go far. You might get caught in traffic.     · For a laparoscopic surgery, most people can go back to work or their normal routine in 1 to 2 weeks, but it may take longer. For an open surgery, it will probably take 4 to 6 weeks before you get back to your normal routine.     · Your doctor will tell you when you can have sex again.    Diet    · Eat smaller meals more often instead of fewer larger meals. You can eat a normal diet, but avoid eating fatty foods for about 1 month. Fatty foods include hamburger, whole milk, cheese, and many snack foods. If your stomach is upset, try bland, low-fat foods like plain rice, broiled chicken, toast, and yogurt.     · Drink plenty of fluids (unless your doctor tells you not to).   · If you have diarrhea, try avoiding spicy foods, dairy products, fatty foods, and alcohol. You can also watch to see if specific foods cause it, and stop eating them. If the diarrhea continues for more than 2 weeks, talk to your doctor.     · You may notice that your bowel movements are not regular right after your surgery. This is common. Try to avoid constipation and straining with bowel movements. You may want to take a fiber supplement every day. If you have not had a bowel movement after a couple of days, ask your doctor about taking a mild laxative.    Medicines    · Your doctor will tell you if and when you can restart your medicines. He or she will also give you instructions about taking any new medicines.     · If you take blood thinners, such as warfarin (Coumadin), clopidogrel (Plavix), or aspirin, be sure to talk to your doctor. He or she will tell you if and when to start taking those medicines again. Make sure that you understand exactly what your doctor wants you to do.     · Take pain medicines exactly as directed. ? If the doctor gave you a prescription medicine for pain, take it as prescribed. ? If you are not taking a prescription pain medicine, take an over-the-counter medicine such as acetaminophen (Tylenol), ibuprofen (Advil, Motrin), or naproxen (Aleve). Read and follow all instructions on the label. ? Do not take two or more pain medicines at the same time unless the doctor told you to. Many pain medicines contain acetaminophen, which is Tylenol. Too much Tylenol can be harmful.     · If you think your pain medicine is making you sick to your stomach:  ? Take your medicine after meals (unless your doctor tells you not to). ? Ask your doctor for a different pain medicine.     · If your doctor prescribed antibiotics, take them as directed. Do not stop taking them just because you feel better. You need to take the full course of antibiotics. Incision care    · If you have strips of tape on the incision, or cut, leave the tape on for a week or until it falls off.     · After 24 to 48 hours, wash the area daily with warm, soapy water, and pat it dry.     · You may have staples to hold the cut together. Keep them dry until your doctor takes them out. This is usually in 7 to 10 days.     · Keep the area clean and dry. You may cover it with a gauze bandage if it weeps or rubs against clothing. Change the bandage every day.    Ice    · To reduce swelling and pain, put ice or a cold pack on your belly for 10 to 20 minutes at a time. Do this every 1 to 2 hours.  Put a thin cloth between the ice and your skin. Follow-up care is a key part of your treatment and safety. Be sure to make and go to all appointments, and call your doctor if you are having problems. It's also a good idea to know your test results and keep a list of the medicines you take. When should you call for help? Call 911 anytime you think you may need emergency care. For example, call if:    · You passed out (lost consciousness).     · You are short of breath. Komal Crate your doctor now or seek immediate medical care if:    · You are sick to your stomach and cannot drink fluids.     · You have pain that does not get better when you take your pain medicine.     · You cannot pass stools or gas.     · You have signs of infection, such as:  ? Increased pain, swelling, warmth, or redness. ? Red streaks leading from the incision. ? Pus draining from the incision. ? A fever.     · Bright red blood has soaked through the bandage over your incision.     · You have loose stitches, or your incision comes open.     · You have signs of a blood clot in your leg (called a deep vein thrombosis), such as:  ? Pain in your calf, back of knee, thigh, or groin. ? Redness and swelling in your leg or groin.    Watch closely for any changes in your health, and be sure to contact your doctor if you have any problems. Where can you learn more? Go to http://delphine-nestor.info/. Enter [phone] [id number] [phone] in the search box to learn more about \"Cholecystectomy: What to Expect at Home. \"  Current as of: November 7, 2018  Content Version: 12.1  © 0459-6472 [employer]. Care instructions adapted under license by [employer] (which disclaims liability or warranty for this information). If you have questions about a medical condition or this instruction, always ask your healthcare professional. [place] [address] any warranty or liability for your use of this information.          DISCHARGE SUMMARY from Nurse    PATIENT INSTRUCTIONS:    After general anesthesia or intravenous sedation, for 24 hours or while taking prescription Narcotics:  · Limit your activities  · Do not drive and operate hazardous machinery  · Do not make important personal or business decisions  · Do  not drink alcoholic beverages  · If you have not urinated within 8 hours after discharge, please contact your surgeon on call. Report the following to your surgeon:  · Excessive pain, swelling, redness or odor of or around the surgical area  · Temperature over 100.5  · Nausea and vomiting lasting longer than 4 hours or if unable to take medications  · Any signs of decreased circulation or nerve impairment to extremity: change in color, persistent  numbness, tingling, coldness or increase pain  · Any questions    What to do at Home:  Recommended activity: Activity as tolerated, . *  Please give a list of your current medications to your Primary Care Provider. *  Please update this list whenever your medications are discontinued, doses are      changed, or new medications (including over-the-counter products) are added. *  Please carry medication information at all times in case of emergency situations. These are general instructions for a healthy lifestyle:    No smoking/ No tobacco products/ Avoid exposure to second hand smoke  Surgeon General's Warning:  Quitting smoking now greatly reduces serious risk to your health. Obesity, smoking, and sedentary lifestyle greatly increases your risk for illness    A healthy diet, regular physical exercise & weight monitoring are important for maintaining a healthy lifestyle    You may be retaining fluid if you have a history of heart failure or if you experience any of the following symptoms:  Weight gain of 3 pounds or more overnight or 5 pounds in a week, increased swelling in our hands or feet or shortness of breath while lying flat in bed.   Please call your doctor as soon as you notice any of these symptoms; do not wait until your next office visit. The discharge information has been reviewed with the patient. The patient verbalized understanding. Discharge medications reviewed with the patient and appropriate educational materials and side effects teaching were provided.   ___________________________________________________________________________________________________________________________________

## 2019-08-16 NOTE — PROGRESS NOTES
Discharge instructions reviewed with the patient. Patient verbalized understanding and verified by teach back. Pt refused to take her paper prescription for percocet. Educated on pain and encouraged her to take the prescription home in case she changes her mind or pain increases. Pt. Eric Quails to state she doesn't want any of that pain medication when she leaves and she will take Tylenol at home. Prescription put in shredder. All questions answered. IV discontinued, no redness, swelling or pain noted. Patient awaiting family for transportation home, with an ETA of 45min. Patient discharged off the unit via wheelchair.  Patient armband removed and shredded

## 2019-08-16 NOTE — PROGRESS NOTES
Progress Note    Patient: Jacob Hoffman MRN: [id number]  CSN: [id number]    YOB: 1945  Age: 76 y.o. Sex: female    DOA: 8/15/2019 LOS:  LOS: 0 days                    Subjective:     She feels well, no SOB    Objective:      Visit Vitals  /57 (BP 1 Location: Left arm, BP Patient Position: At rest)   Pulse 74   Temp 98.1 °F (36.7 °C)   Resp 18   Ht 5' 1\" (1.549 m)   Wt 90.5 kg (199 lb 8 oz)   SpO2 97%   BMI 37.70 kg/m²       Physical Exam:  Anicteric, skin warm and dry, abd benign, nl resp effort    Intake and Output:  Current Shift:  No intake/output data recorded. Last three shifts:  08/14 0701 - 08/15 1900  In: 1000 [I.V.:1000]  Out: 0     Labs: Results:       Chemistry Recent Labs     08/14/19  1009   GLU 79      K 4.3      CO2 34*   BUN 18   CREA 0.88   CA 9.4   AGAP 6   BUCR 20   AP 63   TP 7.4   ALB 3.6   GLOB 3.8   AGRAT 0.9      CBC w/Diff Recent Labs     08/14/19  1009   WBC 11.0   RBC 4.11*   HGB 11.8*   HCT 38.0      GRANS 70   LYMPH 21   EOS 1      Cardiac Enzymes No results for input(s): CPK, CKND1, JOE in the last 72 hours. No lab exists for component: CKRMB, TROIP   Coagulation Recent Labs     08/14/19  1009   PTP 13.7   INR 1.1   APTT 29.3       Lipid Panel No results found for: CHOL, CHOLPOCT, CHOLX, CHLST, CHOLV, 952742, HDL, LDL, LDLC, DLDLP, 717010, VLDLC, VLDL, TGLX, TRIGL, TRIGP, TGLPOCT, CHHD, CHHDX   BNP No results for input(s): BNPP in the last 72 hours.    Liver Enzymes Recent Labs     08/14/19  1009   TP 7.4   ALB 3.6   AP 63   SGOT 16      Thyroid Studies No results found for: T4, T3U, TSH, TSHEXT         Medications Reviewed      Assessment/Plan     Active Problems:    Respiratory difficulty (8/15/2019)      Dyspnea (8/15/2019)      Sleep apnea ()      Overview: c-pap      HTN (hypertension) ()      Asthma ()        D/c home

## 2019-08-16 NOTE — PROGRESS NOTES
Hospitalist Progress Note    Patient: Antwan Arenas MRN: [id number]  CSN: [id number]    YOB: 1945  Age: 76 y.o. Sex: female    DOA: 8/15/2019 LOS:  LOS: 0 days                Assessment/Plan     Patient Active Problem List   Diagnosis Code    Respiratory difficulty R06.03    Dyspnea R06.00    Sleep apnea G47.30    HTN (hypertension) I10    Asthma J45.909            75 yo female admitted post GB surgery for hypoxia. She is off oxygen, denies any complains. Used CPAP at night. CXR with atelectasis. Encourage incentive spirometry. Ok to discharge home. Disposition :     Vital signs/Intake and Output:  Visit Vitals  /48 (BP 1 Location: Left arm, BP Patient Position: At rest)   Pulse 72   Temp 97.3 °F (36.3 °C)   Resp 18   Ht 5' 1\" (1.549 m)   Wt 90.5 kg (199 lb 8 oz)   SpO2 90%   BMI 37.70 kg/m²     Current Shift:  08/16 0701 - 08/16 1900  In: 100 [P.O.:100]  Out: -   Last three shifts:  08/14 1901 - 08/16 0700  In: 1000 [I.V.:1000]  Out: 0             Labs: Results:       Chemistry Recent Labs     08/14/19  1009   GLU 79      K 4.3      CO2 34*   BUN 18   CREA 0.88   CA 9.4   AGAP 6   BUCR 20   AP 63   TP 7.4   ALB 3.6   GLOB 3.8   AGRAT 0.9      CBC w/Diff Recent Labs     08/14/19  1009   WBC 11.0   RBC 4.11*   HGB 11.8*   HCT 38.0      GRANS 70   LYMPH 21   EOS 1      Cardiac Enzymes No results for input(s): CPK, CKND1, JOE in the last 72 hours. No lab exists for component: CKRMB, TROIP   Coagulation Recent Labs     08/14/19  1009   PTP 13.7   INR 1.1   APTT 29.3       Lipid Panel No results found for: CHOL, CHOLPOCT, CHOLX, CHLST, CHOLV, 466653, HDL, LDL, LDLC, DLDLP, 768924, VLDLC, VLDL, TGLX, TRIGL, TRIGP, TGLPOCT, CHHD, CHHDX   BNP No results for input(s): BNPP in the last 72 hours.    Liver Enzymes Recent Labs     08/14/19  1009   TP 7.4   ALB 3.6   AP 63   SGOT 16      Thyroid Studies No results found for: T4, T3U, TSH, TSHEXT Procedures/imaging: see electronic medical records for all procedures/Xrays and details which were not copied into this note but were reviewed prior to creation of Plan

## 2019-08-16 NOTE — ROUTINE PROCESS
Bedside and Verbal shift change report given to NUNU Cuellar (oncoming nurse) by ANIYAH Carrasco, ALEXIA (offgoing nurse). Report included the following information SBAR, Kardex, Intake/Output, MAR and Recent Results.

## 2019-08-16 NOTE — PROGRESS NOTES
Transition of Care ([facility]) Plan:    Physician follow up     Chart reviewed. Pt admitted for an elective surgical procedure (LAPAROSCOPIC CHOLECYSTECTOMY). Pt is independent. Please encourage ambulation. No transition of care needs identified at this time. Anticipate pt will be medically stable for discharge within the next 24-48 hours with physician follow up. CM available to assist as needed. 1105:  CM and provider met with pt and her  at bedside to discuss transition of care. Pt uses a cpap at home and does not have or use any other DME. Pt's  will assist as needed. No other transition of care needs have been identified. [facility] Transportation:   How is patient being transported at discharge? Family/Friend      When? Once cleared by physician     Is transport scheduled? N/A      Follow-up appointment and transportation:   PCP/Specialist?  See AVS for Appointment         Who is transporting to the follow-up appointment? Self/Family/Friend      Is transport for follow up appointment scheduled? N/A    Communication plan (with patient/family): Who is being called? Patient or Next of Kin? Responsible party? Patient      What number(s) is to be used? See Facesheet      What service provider is calling for [facility] services? When are they calling? Readmission Risk? (Green/Low; Yellow/Moderate; Red/High):  Green    Care Management Interventions  PCP Verified by CM: Yes  Mode of Transport at Discharge:  Other (see comment)(Family)  Transition of Care Consult (CM Consult): Discharge Planning  Health Maintenance Reviewed: Yes  Current Support Network: Lives with Spouse  Confirm Follow Up Transport: Family  Discharge Location  Discharge Placement: Home with family assistance

## 2019-08-16 NOTE — OP NOTES
[address] REPORT    Name:  Karis Kulkarni  MR#:   [id number]  :  1945  ACCOUNT #:  [de-identified]  DATE OF SERVICE:  08/15/2019    PREOPERATIVE DIAGNOSIS:  Chronic cholecystitis. POSTOPERATIVE DIAGNOSIS:  Chronic cholecystitis. PROCEDURE PERFORMED:  Laparoscopic cholecystectomy. SURGEON:  Coby Bangura MD    FIRST ASSISTANT:  Sami Sampson. ANESTHESIA:  General and local.    COMPLICATIONS:  None. SPECIMENS REMOVED:  Gallbladder. DRAINS:  None. IMPLANTS:  None. ESTIMATED BLOOD LOSS:  5 mL. INDICATIONS FOR PROCEDURE:  A 59-year-old female with persistent right upper quadrant abdominal pain. She was seen in the emergency room, and she was found to have sludge and gallstones. Her white count was 16,000. She was discharged and is now brought back to the hospital for cholecystectomy. DESCRIPTION OF THE PROCEDURE:  The patient was brought into the operating room, placed on the table in the supine position. After placing monitors and adequate general anesthesia, the abdomen was prepped and draped in the usual sterile fashion. SCD hoses were placed preoperatively. A small horizontal incision was made superior to the umbilicus through the skin down to subcutaneous tissue. A Veress needle was placed in the abdomen. It was insufflated with carbon dioxide to 15 mmHg pressure. A 5-mm port was placed at this location, and the scope was placed in the peritoneal cavity. Two 5-mm ports were placed in the right subcostal area and a 10-mm port was placed at the subxiphoid area. The gallbladder was distended and looked minimally inflamed. The fundus of the gallbladder was retracted in a cephalad direction of the liver. The peritoneum overlying the body and infundibulum was dissected free. The cystic duct was dissected out. It was normal size with minimal inflammation. The common bile duct was clearly identified, and it was uninflamed and normal size. The cystic duct was clipped and divided. The cystic artery was dissected out, was clipped and divided. The rest of the gallbladder was removed from the liver with the electrocautery. The gallbladder was brought up the subxiphoid port. The right upper quadrant was irrigated with saline. There was no active bleeding or evidence of a bile leak. The ports were removed under direct visualization. There was no active bleeding. Fascial defect in the subxiphoid area was closed with interrupted 0 Vicryl suture. Marcaine was injected locally and 4-0 Monocryl was used to reapproximate the skin. Steri-Strips were applied, and the wounds were dressed sterilely. The sponge, instrument, and needle count was correct at the end of the procedure.       Gm Edmond MD      [id number]  D:  08/15/2019 13:44  T:  08/15/2019 13:52  JOB #:  [id number]

## 2019-08-16 NOTE — ROUTINE PROCESS
07:35: Received verbal/bedside change of shift report from ANIYAH Sky RN. Report included SBAR, KARDEX, MAR and recent results. 08:00: Pt received awake in bed talking on cell phone and in NAD. Respirations even and unlabored, skin W&D. IVF infusing as ordered. Pt denies C/O and appears euthymic (D/C anticipated today.) Monitoring ongoing. 10:00: Pharmacy called X2 R/T insufficient quantity of medication (norvasc) available in [facility] for pt without result. Pt stated she will \"take her home supply meds in a few hours\" as she is being D/C'd today. 10:40: Pt requested and received PRN percocet at this time for pain she rated as being 5/10 on numeric pain scale.

## 2021-12-28 NOTE — INTERVAL H&P NOTE
H&P Update: 
Aaron Arrington was seen and examined. History and physical has been reviewed. The patient has been examined. There have been no significant clinical changes since the completion of the originally dated History and Physical. 
Patient identified by surgeon; surgical site was confirmed by patient and surgeon. Tranexamic Acid Pregnancy And Lactation Text: It is unknown if this medication is safe during pregnancy or breast feeding.

## 2022-04-05 ENCOUNTER — TRANSCRIBE ORDER (OUTPATIENT)
Dept: SCHEDULING | Age: 77
End: 2022-04-05

## 2022-04-05 DIAGNOSIS — R91.8 NONSPECIFIC ABNORMAL FINDINGS ON IMAGING OF LUNG: Primary | ICD-10-CM

## 2022-05-31 ENCOUNTER — HOSPITAL ENCOUNTER (OUTPATIENT)
Dept: PET IMAGING | Age: 77
Discharge: HOME | End: 2022-05-03
Attending: INTERNAL MEDICINE

## 2022-05-31 ENCOUNTER — HOSPITAL ENCOUNTER (OUTPATIENT)
Dept: PET IMAGING | Age: 77
Discharge: HOME | End: 2022-05-31
Attending: INTERNAL MEDICINE
Payer: MEDICARE

## 2022-05-31 DIAGNOSIS — R91.8 NONSPECIFIC ABNORMAL FINDINGS ON IMAGING OF LUNG: ICD-10-CM

## 2022-05-31 PROCEDURE — 74011000250 HC RX REV CODE- 250: Performed by: INTERNAL MEDICINE

## 2022-05-31 PROCEDURE — A9552 F18 FDG: HCPCS

## 2022-05-31 RX ORDER — BARIUM SULFATE 20 MG/ML
450 SUSPENSION ORAL
Status: COMPLETED | OUTPATIENT
Start: 2022-05-31 | End: 2022-05-31

## 2022-05-31 RX ORDER — FLUDEOXYGLUCOSE F-18 200 MCI/ML
5-10 INJECTION INTRAVENOUS ONCE
Status: COMPLETED | OUTPATIENT
Start: 2022-05-31 | End: 2022-05-31

## 2022-05-31 RX ADMIN — FLUDEOXYGLUCOSE F-18 7.72 MILLICURIE: 200 INJECTION INTRAVENOUS at 10:05

## 2022-05-31 RX ADMIN — BARIUM SULFATE 450 ML: 20 SUSPENSION ORAL at 10:38

## 2022-06-09 NOTE — PROGRESS NOTES
1941 -  Head to toe assessment performed at this time. Pt denies any chest pain or SOB. Pt denies any numbness or tingling to extremities. Pt encouraged to manage pain and to use the incentive spirometer. Pt educated on the side effects of medications taken. Pt left with call light within reach and bed in low position. Will continue to monitor. 2328 - Pt state pain as 8/10. Pt received medication per MAR and ice. Pt informed of the side effects of the medication and the next time medication can be given. Pt encouraged to call for assistance and to manage pain. Pt encouraged to use incentive spirometer. Pt left with call light within reach, bed in low position and wheels locked. Will continue to monitor. Shift summary - Pt pain managed with prn medication per MAR. Pt informed about all medications and side effects and encouraged to ask questions about medication. Pt encouraged throughout the night to use incentive spirometer and the purpose of the incentive spirometer. Pt left with no signs of distress and any concerns of pt addressed. Azithromycin Counseling:  I discussed with the patient the risks of azithromycin including but not limited to GI upset, allergic reaction, drug rash, diarrhea, and yeast infections.

## (undated) DEVICE — MASTISOL ADHESIVE LIQ 2/3ML

## (undated) DEVICE — COVADERM PLUS: Brand: DEROYAL

## (undated) DEVICE — STRIP,CLOSURE,WOUND,MEDI-STRIP,1/2X4: Brand: MEDLINE

## (undated) DEVICE — APPLIER CLP M L L11.4IN DIA10MM ENDOSCP ROT MULT FOR LIG

## (undated) DEVICE — SUT MONOCRYL PLUS UD 4-0 --

## (undated) DEVICE — SLEEVE TRCR L100MM DIA5MM UNIV STBL FOR BLDELSS DIL TIP

## (undated) DEVICE — GARMENT,MEDLINE,DVT,INT,CALF,MED, GEN2: Brand: MEDLINE

## (undated) DEVICE — TROCAR ENDOSCP L100MM DIA11MM DIL TIP STBL SL DISP ENDOPATH

## (undated) DEVICE — TROCAR ENDOSCP SHFT L100MM DIA5MM DIL TIP ENDOPATH XCEL

## (undated) DEVICE — SOL IRRIGATION INJ NACL 0.9% 500ML BTL

## (undated) DEVICE — STERILE POLYISOPRENE POWDER-FREE SURGICAL GLOVES WITH EMOLLIENT COATING: Brand: PROTEXIS

## (undated) DEVICE — DISPOSABLE SUCTION/IRRIGATOR TUBE SET WITH TIP: Brand: AHTO

## (undated) DEVICE — STRAP,POSITIONING,KNEE/BODY,FOAM,4X60": Brand: MEDLINE

## (undated) DEVICE — SOLUTION LACTATED RINGERS INJECTION USP

## (undated) DEVICE — SUT VCRL + 0 36IN UR6 VIO --

## (undated) DEVICE — NEEDLE INSUF L120MM DIA2MM DISP FOR PNEUMOPERI ENDOPATH

## (undated) DEVICE — STERILE COTTON TIPPED APPLICATORS: Brand: PURITAN

## (undated) DEVICE — ELECTRODE ES 36CM LAP WIRE J HK COAT DISPOSABLE CLEANCOAT

## (undated) DEVICE — 4-PORT MANIFOLD: Brand: NEPTUNE 2

## (undated) DEVICE — [HIGH FLOW INSUFFLATOR,  DO NOT USE IF PACKAGE IS DAMAGED,  KEEP DRY,  KEEP AWAY FROM SUNLIGHT,  PROTECT FROM HEAT AND RADIOACTIVE SOURCES.]: Brand: PNEUMOSURE

## (undated) DEVICE — Device

## (undated) DEVICE — ENDOCUT SCISSOR TIP, DISPOSABLE: Brand: RENEW